# Patient Record
Sex: FEMALE | Race: WHITE | NOT HISPANIC OR LATINO | Employment: UNEMPLOYED | URBAN - METROPOLITAN AREA
[De-identification: names, ages, dates, MRNs, and addresses within clinical notes are randomized per-mention and may not be internally consistent; named-entity substitution may affect disease eponyms.]

---

## 2017-03-20 ENCOUNTER — MYC MEDICAL ADVICE (OUTPATIENT)
Dept: FAMILY MEDICINE | Facility: CLINIC | Age: 52
End: 2017-03-20

## 2017-03-20 DIAGNOSIS — Z72.820 POOR SLEEP: Primary | ICD-10-CM

## 2017-03-20 NOTE — TELEPHONE ENCOUNTER
Please advise regarding MRI and sleep issue-should she have another sleep study?  Lori Heard, RN  Triage Nurse

## 2017-03-23 NOTE — TELEPHONE ENCOUNTER
Recommend starting with sleep evaluation; referral placed.  If that doesn't help identify the cause, then a follow up visit could be considered.

## 2017-05-06 ENCOUNTER — MYC MEDICAL ADVICE (OUTPATIENT)
Dept: FAMILY MEDICINE | Facility: CLINIC | Age: 52
End: 2017-05-06

## 2017-05-08 ENCOUNTER — MYC MEDICAL ADVICE (OUTPATIENT)
Dept: FAMILY MEDICINE | Facility: CLINIC | Age: 52
End: 2017-05-08

## 2017-05-08 DIAGNOSIS — Z12.31 VISIT FOR SCREENING MAMMOGRAM: Primary | ICD-10-CM

## 2017-05-08 DIAGNOSIS — Z12.31 ENCOUNTER FOR SCREENING MAMMOGRAM FOR BREAST CANCER: Primary | ICD-10-CM

## 2017-05-09 ENCOUNTER — OFFICE VISIT (OUTPATIENT)
Dept: SLEEP MEDICINE | Facility: CLINIC | Age: 52
End: 2017-05-09
Payer: COMMERCIAL

## 2017-05-09 VITALS
BODY MASS INDEX: 40.02 KG/M2 | HEART RATE: 86 BPM | RESPIRATION RATE: 15 BRPM | OXYGEN SATURATION: 95 % | WEIGHT: 255 LBS | DIASTOLIC BLOOD PRESSURE: 92 MMHG | HEIGHT: 67 IN | SYSTOLIC BLOOD PRESSURE: 142 MMHG

## 2017-05-09 DIAGNOSIS — F45.8 BRUXISM: ICD-10-CM

## 2017-05-09 DIAGNOSIS — Z72.821 POOR SLEEP HYGIENE: ICD-10-CM

## 2017-05-09 DIAGNOSIS — G47.33 OSA (OBSTRUCTIVE SLEEP APNEA): Primary | ICD-10-CM

## 2017-05-09 DIAGNOSIS — E66.9 OBESITY (BMI 30-39.9): ICD-10-CM

## 2017-05-09 PROCEDURE — 99244 OFF/OP CNSLTJ NEW/EST MOD 40: CPT | Performed by: INTERNAL MEDICINE

## 2017-05-09 RX ORDER — LORATADINE 10 MG/1
10 TABLET ORAL DAILY PRN
COMMUNITY
End: 2021-08-16

## 2017-05-09 NOTE — PROGRESS NOTES
[facility]  Outpatient Sleep Medicine Consultation  May 9, 2017      Name: Ne Chavis MRN# [id number]   Age: 52 year old YOB: 1965     Date of Consultation: May 9, 2017  Consultation is requested by: Dorene Waldron MD  [facility]  [address] [place] [address]  Primary care provider: Dorene Waldron  Home clinic: [facility]         Reason for Sleep Consult:     Ne Chavis is a 52 year old female for re-evaluation of sleep apnea.         Assessment and Plan:     Summary Sleep Diagnoses/Recommendations:    1. Obstructive Sleep Apnea :  Known hx of sleep apnea s/p UPPP. Now she is symptomatic after weight gain 50 lbssince last sleep study. Will schedule Home sleep study.We also discussed the pathophysiology of sleep disordered breathing and the importance of treating it if S/he should have it. Patient is advised not to drive if he/she feels drowsy or sleepy. Follow up after sleep study to discuss the result of sleep study and treatment options. Maintenance insomnia may related to symptomatic sleep disordered breathing.   Patient was intolerant CPAP therapy in the past and would like different treatment option, like oral appliance if therapy is considered.    2. Obesity:  Counseled regarding weight loss through diet modification and increased physical activity. Patient was given instuctions of weight loss and advised to follow up her PCP for further weight loss interventions.    3.  Poor sleep hygiene:   - We instructed patient to develop regular sleep-wake schedule and regular sleep habits including regular bedtime and wake time to strengthen circadian rhythm, to sleep as much as needed to feel refreshed, not to spend more time in bed than needed. Bedroom should be dark, cool and quiet.  - We also asked patient to avoid napping during the day, forcing yourself to sleep, taking problems to bed, strenuous activity just before  bedtime, use of caffeine, alcohol or tobacco just before bedtime, reading, eating or watching TV in bed.  Good sleep hygiene and sleep-wake instructions were given.    4.  Bruxism: continue mouth and follow up dentist for mouth guard.        Orders Placed This Encounter   Procedures     HST-Home Sleep Apnea Test       Summary Counseling:  See instructions    Counseling included a comprehensive review of diagnostic and therapeutic strategies as well as risks of inadequate therapy.  Educational materials provided in instructions.    All questions were answered.  The patient indicates understanding of the above issues and agrees with the plan set forth.           History of Present Illness:     Ne Chavis is a 52 year old female with history of severe obstructive sleep apnea S/p UPPP, hyperlipidemia, history of lung sarcoidosis who presents to the [facility] in [place] for re-evaluation of sleep apnea. I was asked to see Ms. Chavis regarding sleep apnea by Dr. Carlisle. Patient was diagnosed severe sleep apnea by sleep study on 8/15/2003. First she tired CPAP for 4 months and unable to tolerate due mask issue and allergies. She wsas seen by Dr. Isaac (ENT) and underwent tonsillectomy with uvulopalatopharyngoplasty and base of tongue somnoplasty. She had repeat sleep study on 2/2005 which showed no sleep apnea. Patient complains witnessed apnea, morning headache and dry mouth. She has difficulty staying sleep at night and frequent awakening. She also wakes up with headache at night and she takes Excedrin with help. She gained about 50 lbs since 2005. She takes Trazodone at night, sometimes helps.    Please see below for sleep ROS details.    PREVIOUS IN- LAB or HOME SLEEP STUDIES:   Date: 8/15/2003   TYPE: PSG   AHI: 38/hr   Titration: CPAP titrated up to 10 cmH2O   Lowest O2 saturation: 85%    PREVIOUS IN- LAB or HOME SLEEP STUDIES: after UPPP surgery   Date: 2/23/2005   TYPE: PSG   AHI: 3.4/hr   Lowest  O2 saturation: 85%      SLEEP-WAKE SCHEDULE:     Ne Chavis     -Describes themself as a morning person;      -ON WEEKDAYS, goes to sleep at 10:00 PM during the week; awakens  5:30 AM with an alarm; falls asleep in 10-15 minutes; denies difficulty falling asleep.     -ON WEEKENDS, goes to sleep at 10:00 PM and wakes up at 7:30 AM without an alarm; falls asleep in 10 minutes.       -Awakens 2-4 times a night for 15-60 minutes before falling back to sleep; awakens to uncertain reasons and then watch TV and lays bed.      -Total sleep time: 5-7 hours per night.    -Naps 0 times/days per week      BEDTIME ACTIVITIES AND SHIFT WORK:    Ne Chavis    -does watch TV in bed and read in bed and does not use electronics in bed.     -does not do shift work.  He/she works day shifts.       SCALES       SLEEP APNEA: Stopbang score: 5       INSOMNIA:  Insomnia severity score: 10       SLEEPINESS: [place] sleepiness scale (ESS):  6   Drowsy driving/near accidents: No          PHQ9: N/A    SLEEP COMPLAINTS:   Snoring- ?  Witness apnea: Yes  Gasping/Choking: No  Excessive daytime sleep: No  Toss/turn: Yes  Excessive tiredness/fatigue:  No  Morning headaches: Yes  Dry mouth/throat: Yes  Dyspnea: No  Coexisting Lung disease: Yes    Coexisting Heart disease: No    Does patient have a bed partner: No  Has bed partner been sleeping separately because of snoring:  N/A            RLS Screen: When you try to relax in the evening or sleep at  night, do you ever have unpleasant, restless feelings in your  legs that can be relieved by walking or movement? No    Periodic limb movement: No    Narcolepsy:       denies sudden urges of sleep attacks     denies cataplexy     denies sleep paralysis      denies hallucinations     Sleep Behaviors:     denies leg symptoms/movements     denies motor restlessness     denies night terrors     Yes bruxism, uses mouth guard     denies automatic behaviors    Other subjective complaints:     Yes  anxiety or rumination      denies pain and discomfort at  night     denies waking up with heart pounding or racing     denies GERD/heartburn         Parasomnia:   NREM - denies recurrent persistent confusional arousal, night eating, sleep terrors. ? Sleepwalking, wakes up at night during the dream and thinks someone is caming home and she turn lights on unknowingly. The dream is not scary.   REM  - denies dream enactment; injuries     Safety: None             Medications:     Current Outpatient Prescriptions   Medication Sig     VITAMIN D, CHOLECALCIFEROL, PO Take 1,000 Units by mouth daily     traZODone (DESYREL) 50 MG tablet Take 0.5-1 tablets (25-50 mg) by mouth nightly as needed for sleep     Calcium Carbonate-Vitamin D (CALCIUM + D) 600-200 MG-UNIT per tablet Take 2 tablets by mouth 2 times daily.     Glucosamine-Chondroit-Vit C-Mn (GLUCOSAMINE CHONDR 1500 COMPLX) CAPS Take 2 capsules by mouth 2 times daily.     OMEGA 3 1000 MG OR CAPS daily     MULTI-DAY VITAMINS OR None Entered     No current facility-administered medications for this visit.         Medication that can affect sleep: Trazodone    Allergies   Allergen Reactions     No Known Drug Allergies             Past Medical History:     Does not need 02 supplement at night     Past Medical History:   Diagnosis Date     Herpes zoster without mention of complication      Other and unspecified anterior pituitary hyperfunction 1990    saw endo; MRI; Elevated Prolactin level     Other and unspecified hyperlipidemia      Sarcoidosis (H) 2004    BX per Pulmonary; Prednisone for a bit     Unspecified hemorrhoids without mention of complication                Past Surgical History:    Yes previous upper airway surgery     Past Surgical History:   Procedure Laterality Date     ARTHROSCOPY KNEE RT/LT  4/30/2009    Dr. Hodges, Right     C RAD RESEC TONSIL/PILLARS  ~2005     LAPAROSCOPIC CHOLECYSTECTOMY  9/10/2008    Dr. Aguayo     THORACOSCOPIC BIOPSY RIB  2004  "   Dr. Johnson            Social History:     Social History   Substance Use Topics     Smoking status: Never Smoker     Smokeless tobacco: Never Used     Alcohol use Yes      Comment: two per year         Chemical History:     Tobacco: No     Uses no caffeine.    EtOH: Yes, very rare  Recreational Drugs: No    Psych Hx:   None    Current dangers to self or others: None           Family History:     Family History   Problem Relation Age of Onset     Cancer - colorectal Father       at age 64; Dx @61     Hypertension Mother      Arthritis Mother      osteoarthritis     CEREBROVASCULAR DISEASE Mother      DIABETES Maternal Grandmother      Thyroid Disease Sister      Hypertension Sister      Hypertension Sister      Hypertension Sister      Hypertension Sister      Hypertension Sister         Sleep Family Hx:        RLS- No  EDELMIRA - sister  Insomnia - No  Parasomnia - No         Review of Systems:     A complete 10 point review of systems was negative other than HPI or as commented below:   Patient denies chest pain,  wheezing, abdominal pain, n&v, fever, chills, dysuria, leg pain or swelling. Patient is also denies ear pain, sore throat, postnasal drip, running nose.  Patient has dyspnea with activity, dry cough, irregular period, joint pain and swelling, depression and anxiety.    Ne Chavis has gained 50 pounds in 12 years.            Physical Examination:   BP (!) 142/92  Pulse 86  Resp 15  Ht 1.702 m (5' 7\")  Wt 115.7 kg (255 lb)  SpO2 95%  BMI 39.94 kg/m2     Neck Circumference: 45 cm   Constitutional: . Awake, alert, cooperative, in no apparent distress  Mood: euthymic; affect congruent with full range and intensity.  Attention/Concentration:  Normal   Eyes: Pupils round and reactive. No icterus.  ENT: Mallampati Class: IV.   Tonsillar Stage: 0  surgically removed  Clear nasal passages. Enlarged inferior turbinates. No deviated septum.  Oropharynx: No high arched palate. No pharyngeal erythema or " exudates, S/p UPPP   Tongue: No macroglossia   Dentition: Good.  Dentures: None  Neck: Supple, no thyroid enlargement.   Cardiovascular: Regular S1 and S2, no gallops or murmurs.   Pulmonary:  Chest symmetric, lungs clear bilaterally and no crackles, wheezes or rales.  Abdomen: Soft, obese, non tender.  Extremities:  No pedal edema.  Muscle/joint: Strength and tone normal   Skin:  No rash or significant lesions.   Neurologic: Alert, oriented x3, no focal neurological deficit.           Data: All pertinent previous laboratory data reviewed     No results found for: PH, PHARTERIAL, PO2, [id number], SAT, PCO2, HCO3, BASEEXCESS, ANGELLA, BEB  Lab Results   Component Value Date    TSH 1.40 08/31/2016    TSH 1.61 05/26/2015     Lab Results   Component Value Date    GLC 97 08/31/2016    GLC 91 05/26/2015     Lab Results   Component Value Date    HGB 14.2 02/24/2014    HGB 14.6 03/19/2013     Lab Results   Component Value Date    BUN 20 08/31/2016    BUN 16 05/26/2015    CR 0.88 08/31/2016    CR 0.95 05/26/2015     Lab Results   Component Value Date    CO2 25 08/31/2016    CO2 27 05/26/2015     No results found for: SHEEBA      Echocardiography: No    Chest x-ray: Ramon 3, 2012 8:37:00 AM    FINDINGS: Negative.    PFT: No        Copy to: Dorene Waldron MD 5/9/2017   [facility] [address], [place] [address]   [phone] Clinic    Total time spent with patient: 40 minutes with this patient today in which 25 minutes was spent in counseling/coordination of care and going over planned testing and recommendations.

## 2017-05-09 NOTE — NURSING NOTE
"Chief Complaint   Patient presents with     Consult     Can get to sleep but can't stay asleep.  No cpap.  SS 13-14yrs ago, only interp available.       Initial Pulse 86  Resp 15  Ht 1.702 m (5' 7\")  Wt 115.7 kg (255 lb)  SpO2 95%  BMI 39.94 kg/m2 Estimated body mass index is 39.94 kg/(m^2) as calculated from the following:    Height as of this encounter: 1.702 m (5' 7\").    Weight as of this encounter: 115.7 kg (255 lb).  Medication Reconciliation: complete       Neck 45cm  17 1/2in  Ess 6      Mary Alice Hdz LPN/MA  "

## 2017-05-09 NOTE — TELEPHONE ENCOUNTER
Looks like the mammogram ordered yesterday is diagnostic.  Will change to screening.  Lori Heard, RN  Triage Nurse

## 2017-05-09 NOTE — MR AVS SNAPSHOT
"              After Visit Summary   5/9/2017    Ne Chavis    MRN: [id number]           Patient Information     Date Of Birth          1965        Visit Information        Provider Department      5/9/2017 3:00 PM Sandip Rivera MD [facility]        Today's Diagnoses     EDELMIRA (obstructive sleep apnea)    -  1      Care Instructions    MY TREATMENT INFORMATION FOR SLEEP APNEA-  Ne Chavis    DOCTOR : Sandip Rivera  SLEEP CENTER :  [facility]  MY CONTACT NUMBER:[phone]      Sleep apnea: Sleep study ordered.    Follow up in sleep clinic 1-2 weeks after sleep study to discuss results of sleep study and treatment options.    Patient was advised not to drive if drowsy or sleepy.    Frequently asked questions:  1. What is Obstructive Sleep Apnea (EDELMIRA)? EDELMIRA is the most common type of sleep apnea. Apnea literally means, \"without breath.\" It is characterized by repetitive pauses in breathing, despite continued effort to breathe, and is usually associated with a reduction in blood oxygen saturation. Apneas can last 10 to over 60 seconds. It is caused by narrowing or collapse of the upper airway as muscles relax during sleep. Severity of sleep apnea is determined by frequency of breathing events and their effect on your sleep and oxygen levels determined during sleep testing.   2. What are the consequences of EDELMIRA? Symptoms include: daytime sleepiness- possibly increasing the risk of falling asleep while driving, unrefreshing/restless sleep, snoring, insomnia, waking frequently to urinate, waking with heartburn or reflux, reduced concentration and memory, and morning headaches. Other health consequences may include development of high blood pressure and other cardiovascular disease in persons who are susceptible. Untreated EDELMIRA  can contribute to heart disease, stroke and diabetes.   3. What are the treatment options? In most situations, sleep apnea is a lifelong disease that " must be managed with daily therapy. Medications are not effective for sleep apnea and surgery is generally not performed until other therapies have been tried. Therapy is usually tailored to the individual patient based on many factors including your wishes as well as severity of sleep apnea and severity of obesity. Continuous Positive Airway (CPAP) is the most reliable treatment. An oral device to hold your jaw forward is usually the next most reliable option. Other options include postioning devices (to keep you off your back), weight loss, and surgery including a tongue pacing device. There is more detail about some of these options below.            1. CPAP-  WHAT DOES IT DO AND HOW CAN I LEARN TO WEAR IT?                               BEFORE I START, CAN I WATCH A MOVIE TO GET A PLAN ON HOW TO USE CPAP?  https://www.PaletteApp.com/watch?a=b5O35wa160L      Continuous positive airway pressure, or CPAP, is the most effective treatment for obstructive sleep apnea. It works by blowing room air, through a mask, to hold your throat open. A decision to use CPAP is a major step forward in the pursuit of a healthier life. The successful use of CPAP will help you breathe easier, sleep better and live healthier. You can choose CPAP equipment from any durable medical equipment provider that meets your needs.  Using CPAP can be a positive experience if you keep these hobson points in mind:  1. Commitment  CPAP is not a quick fix for your problem. It involves a long-term commitment to improve your sleep and your health.    2. Communication  Stay in close communication with both your sleep doctor and your CPAP supplier. Ask lots of questions and seek help when you need it.    3. Consistency  Use CPAP all night, every night and for every nap. You will receive the maximum health benefits from CPAP when you use it every time that you sleep. This will also make it easier for your body to adjust to the treatment.    4. Correction  The  "first machine and mask that you try may not be the best ones for you. Work with your sleep doctor and your CPAP supplier to make corrections to your equipment selection. Ask about trying a different type of machine or mask if you have ongoing problems. Make sure that your mask is a good fit and learn to use your equipment properly.    5. Challenge  Tell a family member or close friend to ask you each morning if you used your CPAP the previous night. Have someone to challenge you to give it your best effort.    6. Connection   Your adjustment to CPAP will be easier if you are able to connect with others who use the same treatment. Ask your sleep doctor if there is a support group in your area for people who have sleep apnea, or look for one on the Internet.  7. Comfort   Increase your level of comfort by using a saline spray, decongestant or heated humidifier if CPAP irritates your nose, mouth or throat. Use your unit's \"ramp\" setting to slowly get used to the air pressure level. There may be soft pads you can buy that will fit over your mask straps. Look on www.CPAP.com for accessories that can help make CPAP use more comfortable.  8. Cleaning   Clean your mask, tubing and headgear on a regular basis. Put this time in your schedule so that you don't forget to do it. Check and replace the filters for your CPAP unit and humidifier.    9. Completion   Although you are never finished with CPAP therapy, you should reward yourself by celebrating the completion of your first month of treatment. Expect this first month to be your hardest period of adjustment. It will involve some trial and error as you find the machine, mask and pressure settings that are right for you.    10. Continuation  After your first month of treatment, continue to make a daily commitment to use your CPAP all night, every night and for every nap.    CPAP-Tips to starting with success:  Begin using your CPAP for short periods of time during the day " while you watch TV or read.    Use CPAP every night and for every nap. Using it less often reduces the health benefits and makes it harder for your body to get used to it.    Make small adjustments to your mask, tubing, straps and headgear until you get the right fit. Tightening the mask may actually worsen the leak.  If it leaves significant marks on your face or irritates the bridge of your nose, it may not be the best mask for you.  Speak with the person who supplied the mask and consider trying other masks. Insurances will allow you to try different masks during the first month of starting CPAP.  Insurance also covers a new mask, hose and filter about every 6 months.    Use a saline nasal spray to ease mild nasal congestion. Neti-Pot or saline nasal rinses may also help. Nasal gel sprays can help reduce nasal dryness.  Biotene mouthwash can be helpful to protect your teeth if you experience frequent dry mouth.  Dry mouth may be a sign of air escaping out of your mouth or out of the mask in the case of a full face mask.  Speak with your provider if you expect that is the case.     Take a nasal decongestant to relieve more severe nasal or sinus congestion.  Do not use Afrin (oxymetazoline) nasal spray more than 3 days in a row.  Speak with your sleep doctor if your nasal congestion is chronic.    Use a heated humidifier that fits your CPAP model to enhance your breathing comfort. Adjust the heat setting up if you get a dry nose or throat, down if you get condensation in the hose or mask.  Position the CPAP lower than you so that any condensation in the hose drains back into the machine rather than towards the mask.    Try a system that uses nasal pillows if traditional masks give you problems.    Clean your mask, tubing and headgear once a week. Make sure the equipment dries fully.    Regularly check and replace the filters for your CPAP unit and humidifier.    Work closely with your sleep provider and your CPAP  supplier to make sure that you have the machine, mask and air pressure setting that works best for you. It is better to stop using it and call your provider to solve problems than to lay awake all night frustrated with the device.    BESIDES CPAP, WHAT OTHER THERAPIES ARE THERE?      Positioning Device  Positioning devices are generally used when sleep apnea is mild and only occurs on your back.This example shows a pillow that straps around the waist. It may be appropriate for those whose sleep study shows milder sleep apnea that occurs primarily when lying flat on one's back. Preliminary studies have shown benefit but effectiveness at home may need to be verified by a home sleep test. These devices are generally not covered by medical insurance.                      Oral Appliance  What is oral appliance therapy?  An oral appliance is a small acrylic device that fits over the upper and lower teeth or tongue (similar to an orthodontic retainer or a mouth guard). This device slightly advances the lower jaw or tongue, which moves the base of the tongue forward, opens the airway, improves breathing and can effectively treat snoring and obstructive sleep apnea sleep apnea. The appliance is fabricated and customized by a qualified dentist with experience in treating snoring and sleep apnea. Oral appliances are usually well tolerated and have relatively high compliance by patients1, 2, 3.  When is an oral appliance indicated?  Oral appliance therapy is recommended as a first-line treatment for patients with primary snoring, mild sleep apnea, and for patients with moderate sleep apnea who prefer appliance therapy to use of CPAP4, 5. Severity of sleep apnea is determined by sleep testing and is based on the number of respiratory events per hour of sleep.   How successful is oral appliance therapy?  The success rate of oral appliance therapy in patients with mild sleep apnea is 75-80% while in patients with moderate sleep  apnea it is 50-70%. The chance of success in patients with severe sleep apnea is 40-50%. The research also shows that oral appliances have a beneficial effect on the cardiovascular health of EDELMIRA patients at the same magnitude as CPAP therapy7.  Oral appliances should be a second-line treatment in cases of severe sleep apnea, but if not completely successful then a combination therapy utilizing CPAP plus oral appliance therapy may be effective. Oral appliances tend to be effective in a broad range of patients although studies show that the patients who have the highest success are females, younger patients, those with milder disease, and less severe obesity. 3, 6.   The chances of success are lower in patients who have more severe EDELMIRA, are older, and those who are morbidly obese.     Example of an oral appliance   Finding a dentist that practices dental sleep medicine  Specific training is available through the American Academy of Dental Sleep Medicine for dentists interested in working in the field of sleep. To find a dentist who is educated in the field of sleep and the use of oral appliances, near you, visit the Web site of the American Academy of Dental Sleep Medicine; also see   http://www.accpstorage.org/newOrganization/patients/oralAppliances.pdf  To search for a dentist certified in these practices:  Http://aadsm.org/FindADentist.aspx?1  1. Denisa et al. Objectively measured vs self-reported compliance during oral appliance therapy for sleep-disordered breathing. Chest 2013; 144(5): 2689-0950.  2. Caroline, et al. Objective measurement of compliance during oral appliance therapy for sleep-disordered breathing. Thorax 2013; 68(1): 91-96.  3. Ludin et al. Mandibular advancement devices in 620 men and women with EDELMIRA and snoring: tolerability and predictors of treatment success. Chest 2004; 125: 8571-0587.  4. Yoko et al. Oral appliances for snoring and EDELMIRA: a review. Sleep 2006; 29: 244-262.  5.  Meme et al. Oral appliance treatment for EDELMIRA: an update. J Clin Sleep Med 2014; 10(2): 215-227.  6. Dania et al. Predictors of OSAH treatment outcome. J Dent Res 2007; 86: 0042-6063.    Nasal Valves                 Nasal valves may not be effective if you have frequent nasal congestion or have difficulty breathing through your nose. They may be an option for mild apnea if other options are not well tolerated. The efficacy of these devices is generally less than CPAP or oral appliances.      Weight Loss:    Weight management is a personal decision.  If you are interested in exploring weight loss strategies, the following discussion covers the impact on weight loss on sleep apnea and the approaches that may be successful.    Weight loss decreases severity of sleep apnea in most people with obesity. For those with mild obesity who have developed snoring with weight gain, even 15-30 pound weight loss can improve and occasionally eliminate sleep apnea.  Structured and life-long dietary and health habits are necessary to lose weight and keep healthier weight levels.     Though there may be significant health benefits from weight loss, long-term weight loss is very difficult to achieve- studies show success with dietary management in less than 10% of people. In addition, substantial weight loss may require years of dietary control and may be difficult if patients have severe obesity. In these cases, surgical management may be considered.  Finally, older individuals who have tolerated obesity without health complications may be less likely to benefit from weight loss strategies.    Your BMI is Body mass index is 39.94 kg/(m^2).  Body mass index (BMI) is one way to tell whether you are at a healthy weight, overweight, or obese. It measures your weight in relation to your height.  A BMI of 18.5 to 24.9 is in the healthy range. A person with a BMI of 25 to 29.9 is considered overweight, and someone with a BMI of 30  or greater is considered obese. More than two-thirds of American adults are considered overweight or obese.  Being overweight or obese increases the risk for further weight gain. Excess weight may lead to heart disease and diabetes.  Creating and following plans for healthy eating and physical activity may help you improve your health.  Weight control is part of healthy lifestyle and includes exercise, emotional health, and healthy eating habits. Careful eating habits lifelong are the mainstay of weight control. Though there are significant health benefits from weight loss, long-term weight loss with diet alone may be very difficult to achieve- studies show long-term success with dietary management in less than 10% of people. Attaining a healthy weight may be especially difficult to achieve in those with severe obesity. In some cases, medications, devices and surgical management might be considered.  What can you do?  If you are overweight or obese and are interested in methods for weight loss, you should discuss this with your provider.     Consider reducing daily calorie intake by 500 calories.     Keep a food journal.     Avoiding skipping meals, consider cutting portions instead.    Diet combined with exercise helps maintain muscle while optimizing fat loss. Strength training is particularly important for building and maintaining muscle mass. Exercise helps reduce stress, increase energy, and improves fitness. Increasing exercise without diet control, however, may not burn enough calories to loose weight.       Start walking three days a week 10-20 minutes at a time    Work towards walking thirty minutes five days a week     Eventually, increase the speed of your walking for 1-2 minutes at time    In addition, we recommend that you review healthy lifestyles and methods for weight loss available through the National Institutes of Health patient information  sites:  http://win.niddk.nih.gov/publications/index.htm    And look into health and wellness programs that may be available through your health insurance provider, employer, local community center, or karen club.    Weight management plan: Patient was referred to their PCP to discuss a diet and exercise plan.    Surgery:    Upper Airway Surgery for EDELMIRA  Surgery for EDELMIRA is a second-line treatment option in the management of sleep apnea.  Surgery should be considered for patients who are having a difficult time tolerating CPAP.    Surgery for EDELMIRA is directed at areas that are responsible for narrowing or complete obstruction of the airway during sleep.  There are a wide range of procedures available to enlarge and/or stabilize the airway to prevent blockage of breathing in the three major areas where it can occur: the palate, tongue, and nasal regions.  Successful surgical treatment depends on the accurate identification of the factors responsible for obstructive sleep apnea in each person.  A personalized approach is required because there is no single treatment that works well for everyone.  Because of anatomic variation, consultation with an examination by a sleep surgeon is a critical first step in determining what surgical options are best for each patient.  In some cases, examination during sedation may be recommended in order to guide the selection of procedures.  Patients will be counseled about risks and benefits as well as the typical recovery course after surgery. Surgery is typically not a cure for a person s EDELMIRA.  However, surgery will often significantly improve one s EDELMIRA severity (termed  success rate ).  Even in the absence of a cure, surgery will decrease the cardiovascular risk associated with OSA7; improve overall quality of life8 (sleepiness, functionality, sleep quality, etc).          Palate Procedures:  Patients with EDELMIRA often have narrowing of their airway in the region of their tonsils and  uvula.  The goals of palate procedures are to widen the airway in this region as well as to help the tissues resist collapse.  Modern palate procedure techniques focus on tissue conservation and soft tissue rearrangement, rather than tissue removal.  Often the uvula is preserved in this procedure. Residual sleep apnea is common in patient after pharyngoplasty with an average reduction in sleep apnea events of 33%2.      Tongue Procedures:  While patients are awake, the muscles that surround the throat are active and keep this region open for breathing. These muscles relax during sleep, allowing the tongue and other structures to collapse and block breathing.  There are several different tongue procedures available.  Selection of a tongue base procedure depends on characteristics seen on physical exam.  Generally, procedures are aimed at removing bulky tissues in this area or preventing the back of the tongue from falling back during sleep.  Success rates for tongue surgery range from 50-62%3.    Hypoglossal Nerve Stimulation:  Hypoglossal nerve stimulation has recently received approval from the [place] Food and Drug Administration for the treatment of obstructive sleep apnea.  This is based on research showing that the system was safe and effective in treating sleep apnea6.  Results showed that the median AHI score decreased 68%, from 29.3 to 9.0. This therapy uses an implant system that senses breathing patterns and delivers mild stimulation to airway muscles, which keeps the airway open during sleep.  The system consists of three fully implanted components: a small generator (similar in size to a pacemaker), a breathing sensor, and a stimulation lead.  Using a small handheld remote, a patient turns the therapy on before bed and off upon awakening.    Candidates for this device must be greater than 22 years of age, have moderate to severe EDELMIRA (AHI between 20-65), BMI less than 32, have tried CPAP/oral  appliance without success, and have appropriate upper airway anatomy (determined by a sleep endoscopy performed by Dr. Zhagn).    Hypoglossal Nerve Stimulation Pathway:    The sleep surgeon s office will work with the patient through the insurance prior-authorization process (including communications and appeals).    Nasal Procedures:  Nasal obstruction can interfere with nasal breathing during the day and night.  Studies have shown that relief of nasal obstruction can improve the ability of some patients to tolerate positive airway pressure therapy for obstructive sleep apnea1.  Treatment options include medications such as nasal saline, topical corticosteroid and antihistamine sprays, and oral medications such as antihistamines or decongestants. Non-surgical treatments can include external nasal dilators for selected patients. If these are not successful by themselves, surgery can improve the nasal airway either alone or in combination with these other options.      Combination Procedures:  Combination of surgical procedures and other treatments may be recommended, particularly if patients have more than one area of narrowing or persistent positional disease.  The success rate of combination surgery ranges from 66-80%2,3.      1. Mikaela FARMER. The Role of the Nose in Snoring and Obstructive Sleep Apnoea: An Update.  Eur Arch Otorhinolaryngol. 2011; 268: 1365-73.  2.  Capmarlene SM; Alejandra JA; Radha JR; Pallanch JF; Janna MB; Linda SG; Lu FALCON. Surgical modifications of the upper airway for obstructive sleep apnea in adults: a systematic review and meta-analysis. SLEEP 2010;33(10):6800-9487. Greg HEIN. Hypopharyngeal surgery in obstructive sleep apnea: an evidence-based medicine review.  Arch Otolaryngol Head Neck Surg. 2006 Feb;132(2):206-13.  3. Wale BELLH1, You Y, Tylor ROBINS. The efficacy of anatomically based multilevel surgery for obstructive sleep apnea. Otolaryngol Head Neck Surg. 2003  Oct;129(4):327-35.  4. Kezirian E, Goldberg A. Hypopharyngeal Surgery in Obstructive Sleep Apnea: An Evidence-Based Medicine Review. Arch Otolaryngol Head Neck Surg. 2006 Feb;132(2):206-13.  5. Pramod PERALES et al. Upper-Airway Stimulation for Obstructive Sleep Apnea.  N Engl J Med. 2014 Jan 9;370(2):139-49.  6. Jer Y et al. Increased Incidence of Cardiovascular Disease in Middle-aged Men with Obstructive Sleep Apnea. Am J Respir Crit Care Med; 2002 166: 159-165  7. Santizonirmala OLVERA et al. Studying Life Effects and Effectiveness of Palatopharyngoplasty (SLEEP) study: Subjective Outcomes of Isolated Uvulopalatopharyngoplasty. Otolaryngol Head Neck Surg. 2011; 144: 623-631.    Your blood pressure was checked while you were in clinic today.  Please read the guidelines below about what these numbers mean and what you should do about them.  Your systolic blood pressure is the top number.  This is the pressure when the heart is pumping.  Your diastolic blood pressure is the bottom number.  This is the pressure in between beats.  If your systolic blood pressure is less than 120 and your diastolic blood pressure is less than 80, then your blood pressure is normal. There is nothing more that you need to do about it  If your systolic blood pressure is 120-139 or your diastolic blood pressure is 80-89, your blood pressure may be higher than it should be.  You should have your blood pressure re-checked within a year by a primary care provider.  If your systolic blood pressure is 140 or greater or your diastolic blood pressure is 90 or greater, you may have high blood pressure.  High blood pressure is treatable, but if left untreated over time it can put you at risk for heart attack, stroke, or kidney failure.  You should have your blood pressure re-checked by a primary care provider within the next four weeks.    Good Sleep hygiene tips (American Academy of Sleep Medicine):  Maintain a regular sleep-wake routine    Go to bed at the  same time. Wake up at the same time. Ideally, your schedule will remain the same (+/- 20 minutes) every night of the week.  Avoid naps if possible    Naps decrease the  Sleep Debt  that is so necessary for easy sleep onset.    Each of us needs a certain amount of sleep per 24-hour period. We need that amount, and we don t need more than that.    When we take naps, it decreases the amount of sleep that we need the next night - which may cause sleep fragmentation and difficulty initiating sleep, and may lead to insomnia.  Don t stay in bed awake for more than 15-20 minutes. Stimulus control    If you find your mind racing, or worrying about not being able to sleep during the middle of the night, get out of bed, and sit in a chair in the dark. Do your mind racing in the chair until you are sleepy, then return to bed. No TV or internet or eat during these periods! That will just stimulate you more than desired.    If this happens several times during the night, that is OK. Just maintain your regular wake time, and try to avoid naps.  Don t watch TV or read in bed or eat in bed.    When you watch TV or read in bed or eat in bed, you associate the bed with wakefulness.    The bed is reserved for two things - sleep and hanky panky.  Drink caffeinated drinks with caution    The effects of caffeine may last for several hours after ingestion. Caffeine can fragment sleep, and cause difficulty initiating sleep. If you drink caffeine, use it only before noon.    Remember that soda and tea contain caffeine as well.  Avoid inappropriate substances that interfere with sleep    Cigarettes, alcohol, and over-the-counter medications may cause fragmented sleep.  Exercise regularly    Exercise before 2 pm every day. Exercise promotes continuous sleep.    Avoid rigorous exercise before bedtime. Rigorous exercise circulates endorphins into the body which may cause difficulty initiating sleep.   Have a quiet, comfortable bedroom    Set  your bedroom thermostat at a comfortable temperature. Generally, a little cooler is better than a little warmer.    Turn off the TV and other extraneous noise that may disrupt sleep. Background  white noise  like a fan is OK.    If your pets awaken you, keep them outside the bedroom.    Your bedroom should be dark. Turn off bright lights.    Have a comfortable mattress.  If you are a  clock watcher  at night, hide the clock.      Have a comfortable pre-bedtime routine    A warm bath, shower    Meditation, or quiet time    Wind-down 20 minutes prior of bedtime.              Follow-ups after your visit        Future tests that were ordered for you today     Open Future Orders        Priority Expected Expires Ordered    HST-Home Sleep Apnea Test Routine  11/8/2017 5/9/2017    *MA Screening Digital Bilateral Routine  5/9/2018 5/9/2017            Who to contact     If you have questions or need follow up information about today's clinic visit or your schedule please contact [facility] directly at [phone].  Normal or non-critical lab and imaging results will be communicated to you by Trippy Bandzhart, letter or phone within 4 business days after the clinic has received the results. If you do not hear from us within 7 days, please contact the clinic through [employer]att or phone. If you have a critical or abnormal lab result, we will notify you by phone as soon as possible.  Submit refill requests through [employer] or call your pharmacy and they will forward the refill request to us. Please allow 3 business days for your refill to be completed.          Additional Information About Your Visit        [employer] Information     [employer] gives you secure access to your electronic health record. If you see a primary care provider, you can also send messages to your care team and make appointments. If you have questions, please call your primary care clinic.  If you do not have a primary care provider, please call  "[phone] and they will assist you.        Care EveryWhere ID     This is your Care EveryWhere ID. This could be used by other organizations to access your [place] medical records  [id number]        Your Vitals Were     Pulse Respirations Height Pulse Oximetry BMI (Body Mass Index)       86 15 1.702 m (5' 7\") 95% 39.94 kg/m2        Blood Pressure from Last 3 Encounters:   05/09/17 (!) 142/92   08/31/16 124/78   05/26/15 122/76    Weight from Last 3 Encounters:   05/09/17 115.7 kg (255 lb)   08/31/16 119.3 kg (263 lb 1.6 oz)   05/26/15 112 kg (247 lb)               Primary Care Provider Office Phone # Fax #    Dorene Karie Waldron [phone]       [facility] [address] [facility] [address]        Thank you!     Thank you for choosing [facility]  for your care. Our goal is always to provide you with excellent care. Hearing back from our patients is one way we can continue to improve our services. Please take a few minutes to complete the written survey that you may receive in the mail after your visit with us. Thank you!             Your Updated Medication List - Protect others around you: Learn how to safely use, store and throw away your medicines at www.disposemymeds.org.          This list is accurate as of: 5/9/17  3:34 PM.  Always use your most recent med list.                   Brand Name Dispense Instructions for use    calcium + D 600-200 MG-UNIT Tabs   Generic drug:  calcium carbonate-vitamin D      Take 2 tablets by mouth 2 times daily.       glucosamine chondroitin 1500 complex Caps      Take 2 capsules by mouth 2 times daily.       loratadine 10 MG tablet    CLARITIN     Take 10 mg by mouth daily       MULTI-DAY VITAMINS PO      None Entered       omega 3 1000 MG Caps      daily       traZODone 50 MG tablet    DESYREL    90 tablet    Take 0.5-1 tablets (25-50 mg) by mouth nightly as needed for sleep       VITAMIN D (CHOLECALCIFEROL) PO "      Take 1,000 Units by mouth daily

## 2017-05-31 ENCOUNTER — OFFICE VISIT (OUTPATIENT)
Dept: SLEEP MEDICINE | Facility: CLINIC | Age: 52
End: 2017-05-31
Payer: COMMERCIAL

## 2017-05-31 DIAGNOSIS — G47.33 OSA (OBSTRUCTIVE SLEEP APNEA): ICD-10-CM

## 2017-05-31 DIAGNOSIS — G47.33 OBSTRUCTIVE SLEEP APNEA (ADULT) (PEDIATRIC): ICD-10-CM

## 2017-05-31 PROCEDURE — G0399 HOME SLEEP TEST/TYPE 3 PORTA: HCPCS | Performed by: INTERNAL MEDICINE

## 2017-05-31 NOTE — MR AVS SNAPSHOT
After Visit Summary   5/31/2017    Ne Chavis    MRN: [id number]           Patient Information     Date Of Birth          1965        Visit Information        Provider Department      5/31/2017 10:00 AM  SLEEP LAB [facility]        Today's Diagnoses     Obstructive sleep apnea (adult) (pediatric)           Follow-ups after your visit        Your next 10 appointments already scheduled     Jun 01, 2017 10:00 AM CDT   HST Drop Off with  SLEEP DME   [facility]    [address] [address] [id number] [phone]            Jun 07, 2017  7:45 AM CDT   MA SCREENING DIGITAL BILATERAL with [facility]   [facility]    [address] [place] [id number] [phone]           Do not use any powder, lotion or deodorant under your arms or on your breast. If you do, we will ask you to remove it before your exam.  Wear comfortable, two-piece clothing.  If you have any allergies, tell your care team.  Bring any previous mammograms from other facilities or have them mailed to the breast center.            Jun 14, 2017  4:30 PM CDT   Return Sleep Patient with Sandip Rivera MD   [facility]    [address] [address] [id number] [phone]              Who to contact     If you have questions or need follow up information about today's clinic visit or your schedule please contact [facility] directly at [phone].  Normal or non-critical lab and imaging results will be communicated to you by MyChart, letter or phone within 4 business days after the clinic has received the results. If you do not hear from us within 7 days, please contact the clinic through MyChart or phone. If you have a critical or abnormal lab result, we  will notify you by phone as soon as possible.  Submit refill requests through [employer] or call your pharmacy and they will forward the refill request to us. Please allow 3 business days for your refill to be completed.          Additional Information About Your Visit        [employer] Information     [employer] gives you secure access to your electronic health record. If you see a primary care provider, you can also send messages to your care team and make appointments. If you have questions, please call your primary care clinic.  If you do not have a primary care provider, please call [phone] and they will assist you.        Care EveryWhere ID     This is your Care EveryWhere ID. This could be used by other organizations to access your [place] medical records  [id number]         Blood Pressure from Last 3 Encounters:   05/09/17 (!) 142/92   08/31/16 124/78   05/26/15 122/76    Weight from Last 3 Encounters:   05/09/17 115.7 kg (255 lb)   08/31/16 119.3 kg (263 lb 1.6 oz)   05/26/15 112 kg (247 lb)              Today, you had the following     No orders found for display       Primary Care Provider Office Phone # Fax #    Dorene Waldron [phone]       [facility] [address] [facility]  [facility] [address]        Thank you!     Thank you for choosing [facility]  for your care. Our goal is always to provide you with excellent care. Hearing back from our patients is one way we can continue to improve our services. Please take a few minutes to complete the written survey that you may receive in the mail after your visit with us. Thank you!             Your Updated Medication List - Protect others around you: Learn how to safely use, store and throw away your medicines at www.disposemymeds.org.          This list is accurate as of: 5/31/17  1:01 PM.  Always use your most recent med list.                   Brand Name Dispense Instructions for use    calcium + D  600-200 MG-UNIT Tabs   Generic drug:  calcium carbonate-vitamin D      Take 2 tablets by mouth 2 times daily.       glucosamine chondroitin 1500 complex Caps      Take 2 capsules by mouth 2 times daily.       loratadine 10 MG tablet    CLARITIN     Take 10 mg by mouth daily       MULTI-DAY VITAMINS PO      None Entered       omega 3 1000 MG Caps      daily       traZODone 50 MG tablet    DESYREL    90 tablet    Take 0.5-1 tablets (25-50 mg) by mouth nightly as needed for sleep       VITAMIN D (CHOLECALCIFEROL) PO      Take 1,000 Units by mouth daily

## 2017-05-31 NOTE — PROGRESS NOTES
Patient picked up HST and was instructed on use. They showed understanding by demonstrating it back. Ne applied HST to herself to demonstrate her understanding of use.  She will return on 6/1/2017

## 2017-06-01 ENCOUNTER — TELEPHONE (OUTPATIENT)
Dept: SLEEP MEDICINE | Facility: CLINIC | Age: 52
End: 2017-06-01

## 2017-06-01 ENCOUNTER — DOCUMENTATION ONLY (OUTPATIENT)
Dept: SLEEP MEDICINE | Facility: CLINIC | Age: 52
End: 2017-06-01

## 2017-06-01 NOTE — PROCEDURES
"  [facility] Study Report  ===========================    Patient Information:  --------------------  Ne Chavis  Patient ID:  [id number]   :  1965  Recording date:  2017     Indication of the sleep study: Ne Chavis is a 52 year old female with history of severe obstructive sleep apnea S/p UPPP, hyperlipidemia, history of lung sarcoidosis who was seen at the [facility] in [place] for complaints of witnessed apnea, morning headache and dry mouth. She has difficulty staying sleep at night and frequent awakening. She has known obstructive sleep apnea treated with UPPP in the past. she was intolerant CPAP therapy. Ht 1.702 m (5' 7\")  Wt 115.7 kg (255 lb)  SpO2 95%  BMI 39.94 kg/m2.      Recording Information:  ----------------------  This was a Type 3 unattended sleep study (measuring flow, effort, heart rate and pulse oximetry) performed at home. Please refer to EPIC procedure for detailed scoring report.   This study was considered adequate based on > 4 hours of quality oximetry and respiratory recording. As specified by the AASM Manual for the Scoring of Sleep and Associated events, version 2.3, Rule VIII.D 1B, 4% oxygen desaturation scoring for hypopneas is used as a standard of care on all home sleep apnea testing.  Recording date:  2017   Recording duration:  599.9 minutes  Time in bed:  369.1 minutes  Estimated sleep efficiency was 98.1 %.   Time spent in supine position:  4.0 % of total bed time  The test quality was: adequate for interpretation  The test duration was: adequate for interpretation  Respiratory Analysis:  ---------------------  AHI: 8.0 /hour  AHI (supine):  Non applicable.  AHI (non-supine):  8.3 /hour  THANG: 11.5 /hour (Number of oxygen desaturations per hour)  Snore index: 26.9 (percentage of time spent snoring versus the total time spent in bed)  Central apnea index:  0.2 / hour    The sleep study demonstrated mild sleep disordered breathing " which was characterized predominantly by obstructive apneas and hypopneas. The sleep-disordered breathing was predominantly in all body position. There was no supine sleep position which may underestimated the severity of sleep disordered breathing.    Oximetry Analysis:  ------------------  Baseline oxygen saturation during sleep was low at 88.9%.   Lowest oxygen saturation:  76.0 %  Majority of the sleep time spent with oxygen saturation less than than 90%.   55.9% of the total recording time was spent with oxygen saturation less than 90%.   Time Spent oxygen saturation below 88% was 128 minutes.    Cardiac Analysis:  -----------------  Maximum pulse rate was 95.0 /minute and minimal pulse rate was 62.0/minute. Time spent above 100 bpm was 0.0 minutes and time spent below 40 bpm was 0.0 minutes.    Diagnosis:  ==========  Mild obstructive sleep apnea G47.33  Sleep related hypoxemia G47.36  Hypoxemia, may be due to underlying lung disease R09.02      Recommendations:   ================    1. Based on the presence of the obstructive sleep apnea and marked hypoxemia, treatment could be empirically initiated with Auto-titrating PAP therapy with a range of 6 - 16 cmH2O and plan to change CPAP with fixed pressure in next clinic visit. Recommend clinical follow up with sleep management team.    2. Patient may be a candidate for dental appliance through referral to Sleep Dentistry for the treatment of mild obstructive sleep apnea and/or socially disruptive snoring as patient is intolerant with CPAP.  3. Sleep related hypoxemia and marked hypoxemia with low baseline O 2 saturation, in additon to sleep apnea, there may be secondary to underlying lung disease.  If she is interested, would recommend referral to pulmonolgist provider in setting of history of lung disease, and consider chest x-ray, pulmonary function test, ABG's, 6 minute walk and overnight oximetry.  4. Fit nasal mask with chin strap or as per patient s  preference.   5. Recommend clinical follow up with sleep management team after using PAP for 4-6 weeks for coaching and effectiveness and measures.     6. Recommend optimizing wake-sleep schedule and avoiding sleep deprivation.      Other Recommendations:  ======================  1. Start weight loss program if BMI > 25.    2. Avoid sedating medications, including narcotics, and alcohol, as these may exacerbate sleep apnea and/or underlying respiratory disorders.     3. Avoid sleeping supine position.    4. Avoid driving when drowsy    5. If unable to follow in sleep clinic, then patient should follow with referring physician/primary care doctor.        Electronically signed by:        Sandip Rivera MD  Sleep Medicine Physician  [facility].

## 2017-06-01 NOTE — TELEPHONE ENCOUNTER
The writer discussed Ms. Lieberman the result of home sleep study which showed mild EDELMIRA and marked hypoxemia. She is not complaining nay dyspnea or any respiratory issues. Will keep her appointment on 6/14/17

## 2017-06-12 ENCOUNTER — RADIANT APPOINTMENT (OUTPATIENT)
Dept: MAMMOGRAPHY | Facility: CLINIC | Age: 52
End: 2017-06-12
Attending: INTERNAL MEDICINE
Payer: COMMERCIAL

## 2017-06-12 DIAGNOSIS — Z12.31 VISIT FOR SCREENING MAMMOGRAM: ICD-10-CM

## 2017-06-12 PROCEDURE — G0202 SCR MAMMO BI INCL CAD: HCPCS | Mod: TC

## 2017-06-14 ENCOUNTER — OFFICE VISIT (OUTPATIENT)
Dept: SLEEP MEDICINE | Facility: CLINIC | Age: 52
End: 2017-06-14
Payer: COMMERCIAL

## 2017-06-14 VITALS
HEIGHT: 67 IN | HEART RATE: 84 BPM | BODY MASS INDEX: 39.24 KG/M2 | DIASTOLIC BLOOD PRESSURE: 98 MMHG | WEIGHT: 250 LBS | OXYGEN SATURATION: 94 % | SYSTOLIC BLOOD PRESSURE: 159 MMHG

## 2017-06-14 DIAGNOSIS — R09.02 HYPOXEMIA: ICD-10-CM

## 2017-06-14 DIAGNOSIS — G47.34 SLEEP RELATED HYPOXIA: ICD-10-CM

## 2017-06-14 DIAGNOSIS — E66.9 OBESITY (BMI 30-39.9): ICD-10-CM

## 2017-06-14 DIAGNOSIS — G47.33 OSA (OBSTRUCTIVE SLEEP APNEA): Primary | ICD-10-CM

## 2017-06-14 PROCEDURE — 99214 OFFICE O/P EST MOD 30 MIN: CPT | Performed by: INTERNAL MEDICINE

## 2017-06-14 NOTE — PATIENT INSTRUCTIONS
Your BMI is Body mass index is 38.86 kg/(m^2).  Weight management is a personal decision.  If you are interested in exploring weight loss strategies, the following discussion covers the approaches that may be successful. Body mass index (BMI) is one way to tell whether you are at a healthy weight, overweight, or obese. It measures your weight in relation to your height.  A BMI of 18.5 to 24.9 is in the healthy range. A person with a BMI of 25 to 29.9 is considered overweight, and someone with a BMI of 30 or greater is considered obese. More than two-thirds of American adults are considered overweight or obese.  Being overweight or obese increases the risk for further weight gain. Excess weight may lead to heart disease and diabetes.  Creating and following plans for healthy eating and physical activity may help you improve your health.  Weight control is part of healthy lifestyle and includes exercise, emotional health, and healthy eating habits. Careful eating habits lifelong are the mainstay of weight control. Though there are significant health benefits from weight loss, long-term weight loss with diet alone may be very difficult to achieve- studies show long-term success with dietary management in less than 10% of people. Attaining a healthy weight may be especially difficult to achieve in those with severe obesity. In some cases, medications, devices and surgical management might be considered.  What can you do?  If you are overweight or obese and are interested in methods for weight loss, you should discuss this with your provider.     Consider reducing daily calorie intake by 500 calories.     Keep a food journal.     Avoiding skipping meals, consider cutting portions instead.    Diet combined with exercise helps maintain muscle while optimizing fat loss. Strength training is particularly important for building and maintaining muscle mass. Exercise helps reduce stress, increase energy, and improves fitness.  Increasing exercise without diet control, however, may not burn enough calories to loose weight.       Start walking three days a week 10-20 minutes at a time    Work towards walking thirty minutes five days a week     Eventually, increase the speed of your walking for 1-2 minutes at time    In addition, we recommend that you review healthy lifestyles and methods for weight loss available through the National Institutes of Health patient information sites:  http://win.niddk.nih.gov/publications/index.htm    And look into health and wellness programs that may be available through your health insurance provider, employer, local community center, or karen club.    Your blood pressure was checked while you were in clinic today.  Please read the guidelines below about what these numbers mean and what you should do about them.  Your systolic blood pressure is the top number.  This is the pressure when the heart is pumping.  Your diastolic blood pressure is the bottom number.  This is the pressure in between beats.  If your systolic blood pressure is less than 120 and your diastolic blood pressure is less than 80, then your blood pressure is normal. There is nothing more that you need to do about it  If your systolic blood pressure is 120-139 or your diastolic blood pressure is 80-89, your blood pressure may be higher than it should be.  You should have your blood pressure re-checked within a year by a primary care provider.  If your systolic blood pressure is 140 or greater or your diastolic blood pressure is 90 or greater, you may have high blood pressure.  High blood pressure is treatable, but if left untreated over time it can put you at risk for heart attack, stroke, or kidney failure.  You should have your blood pressure re-checked by a primary care provider within the next four weeks.

## 2017-06-14 NOTE — PROGRESS NOTES
Sleep Study Follow-Up Visit:    Date on this visit: 6/14/2017    ASSESSMENT / PLAN:       EDELMIRA (obstructive sleep apnea)  Sleep related hypoxia  Hypoxemia  Obesity (BMI 30-39.9)  Discussed with patient the recent sleep study and treatment options, patient underwent UPPP in the past and intolerant with CPAP. She has low baseline oxygen saturation and sustained hypoxemia, and she is not smoker but hx of lung sarcoidosis and new cough and dyspnea. Patient prefers to wait the decision for treatment of EDELMIRA until she seems lung specialist. Recommend avoiding sleeping supine position and weight loss as below.    For hypoxemia, hx of lung sarcoidosis, will get ABG's and PFT's and PCP and pulmonary specialist to follow up. She followed [facility] in the past. Patient has dry cough and fatigue during the day and night specially at the end of the day.  Counseled regarding weight loss through diet modification and increased physical activity. Patient was given instuctions of weight loss and advised to follow up her PCP for further weight loss interventions.    All questions were answered.  The patient indicates understanding of the above issues and agrees with the plan set forth.    No orders of the defined types were placed in this encounter.      She will follow up with me after lung specialist follow up.      BRIEF SUMMARY:    Ne Chavis is a 52 year old female with history of severe obstructive sleep apnea S/p UPPP and intolerant CPAP in the past, bruxism, hyperlipidemia, history of lung sarcoidosis comes in today for follow-up of her sleep study done on 5/31/17 at the [facility] for result of sleep study.    Home sleep study: 5/31/17   AHI 8/hr   THANG 11.5/hr  Snore index 26.9%  Lowest O 2 saturation is 76%  Time spent O 2 saturation below 88% was 128 minutes    These findings were reviewed with the patient and copy of the sleep study result was given.    Past medical/surgical history, family history, social  "history, medications and allergies were reviewed.      Problem List:  Patient Active Problem List    Diagnosis Date Noted     Sleep concern 2016     Priority: Medium     past trial of Benadryl; discussed Melatonin and Trazodone; willing to try       Family history of malignant neoplasm of thyroid gland 2016     Priority: Medium     3 of 6 sibs;        Morbid obesity, unspecified obesity type (H) 2016     Priority: Medium     past success with Medifast; got to weight of 195 #; slow increase in weight        Benign neoplasm of colon 2015     tubular adenoma 2014 colonsocopy recheck 5 years; also FH- Father at 61       HYPERLIPIDEMIA LDL GOAL <160 02/10/2010     Menorrhagia 2009     irregular; monitor cycles; consider Medroxyprogesterone and if further eval ; may need Pelvic US.       Family history of colon cancer 2009     Father Dx @ 61,   @ age 64.       Other and unspecified anterior pituitary hyperfunction 2003     Sarcoidosis (H) 2003  Pulmonary did Bx; previous Prednisone               Medications:     Current Outpatient Prescriptions   Medication Sig     loratadine (CLARITIN) 10 MG tablet Take 10 mg by mouth daily     VITAMIN D, CHOLECALCIFEROL, PO Take 1,000 Units by mouth daily     traZODone (DESYREL) 50 MG tablet Take 0.5-1 tablets (25-50 mg) by mouth nightly as needed for sleep     Calcium Carbonate-Vitamin D (CALCIUM + D) 600-200 MG-UNIT per tablet Take 2 tablets by mouth 2 times daily.     Glucosamine-Chondroit-Vit C-Mn (GLUCOSAMINE CHONDR 1500 COMPLX) CAPS Take 2 capsules by mouth 2 times daily.     OMEGA 3 1000 MG OR CAPS daily     MULTI-DAY VITAMINS OR None Entered     No current facility-administered medications for this visit.           PHYSICAL EXAMINATION:  Pulse 84  Ht 1.708 m (5' 7.25\")  Wt 113.4 kg (250 lb)  SpO2 94%  BMI 38.86 kg/m2          Data: All pertinent previous laboratory data reviewed     No results found for: PH, " PHARTERIAL, PO2, [id number], SAT, PCO2, HCO3, BASEEXCESS, ANGELLA, BEB  Lab Results   Component Value Date    TSH 1.40 08/31/2016    TSH 1.61 05/26/2015     Lab Results   Component Value Date    GLC 97 08/31/2016    GLC 91 05/26/2015     Lab Results   Component Value Date    HGB 14.2 02/24/2014    HGB 14.6 03/19/2013     Lab Results   Component Value Date    BUN 20 08/31/2016    BUN 16 05/26/2015    CR 0.88 08/31/2016    CR 0.95 05/26/2015     No results found for: SHEEBA     Twenty-five minutes spent with patient, all of which were spent face-to-face counseling, consulting, coordinating plan of care, going over sleep test results and chart review.        Sandip Rivera MD 6/14/2017   [facility]  [address], [place] [address]   [phone] Clinic        Copy to: Dorene Waldron

## 2017-06-14 NOTE — NURSING NOTE
"Chief Complaint   Patient presents with     RECHECK     f/u Hst 5/31       Initial BP (!) 159/98  Pulse 84  Ht 1.708 m (5' 7.25\")  Wt 113.4 kg (250 lb)  SpO2 94%  BMI 38.86 kg/m2 Estimated body mass index is 38.86 kg/(m^2) as calculated from the following:    Height as of this encounter: 1.708 m (5' 7.25\").    Weight as of this encounter: 113.4 kg (250 lb).  Medication Reconciliation: complete         Mary Alice Hdz LPN/MA  "

## 2017-06-14 NOTE — MR AVS SNAPSHOT
After Visit Summary   6/14/2017    Ne Chavis    MRN: [id number]           Patient Information     Date Of Birth          1965        Visit Information        Provider Department      6/14/2017 4:30 PM Sandip Rivera MD [facility]        Today's Diagnoses     EDELMIRA (obstructive sleep apnea)    -  1    Sleep related hypoxia        Hypoxemia        Obesity (BMI 30-39.9)          Care Instructions      Your BMI is Body mass index is 38.86 kg/(m^2).  Weight management is a personal decision.  If you are interested in exploring weight loss strategies, the following discussion covers the approaches that may be successful. Body mass index (BMI) is one way to tell whether you are at a healthy weight, overweight, or obese. It measures your weight in relation to your height.  A BMI of 18.5 to 24.9 is in the healthy range. A person with a BMI of 25 to 29.9 is considered overweight, and someone with a BMI of 30 or greater is considered obese. More than two-thirds of American adults are considered overweight or obese.  Being overweight or obese increases the risk for further weight gain. Excess weight may lead to heart disease and diabetes.  Creating and following plans for healthy eating and physical activity may help you improve your health.  Weight control is part of healthy lifestyle and includes exercise, emotional health, and healthy eating habits. Careful eating habits lifelong are the mainstay of weight control. Though there are significant health benefits from weight loss, long-term weight loss with diet alone may be very difficult to achieve- studies show long-term success with dietary management in less than 10% of people. Attaining a healthy weight may be especially difficult to achieve in those with severe obesity. In some cases, medications, devices and surgical management might be considered.  What can you do?  If you are overweight or obese and are interested in  methods for weight loss, you should discuss this with your provider.     Consider reducing daily calorie intake by 500 calories.     Keep a food journal.     Avoiding skipping meals, consider cutting portions instead.    Diet combined with exercise helps maintain muscle while optimizing fat loss. Strength training is particularly important for building and maintaining muscle mass. Exercise helps reduce stress, increase energy, and improves fitness. Increasing exercise without diet control, however, may not burn enough calories to loose weight.       Start walking three days a week 10-20 minutes at a time    Work towards walking thirty minutes five days a week     Eventually, increase the speed of your walking for 1-2 minutes at time    In addition, we recommend that you review healthy lifestyles and methods for weight loss available through the National Institutes of Health patient information sites:  http://win.niddk.nih.gov/publications/index.htm    And look into health and wellness programs that may be available through your health insurance provider, employer, local community center, or karen club.    Your blood pressure was checked while you were in clinic today.  Please read the guidelines below about what these numbers mean and what you should do about them.  Your systolic blood pressure is the top number.  This is the pressure when the heart is pumping.  Your diastolic blood pressure is the bottom number.  This is the pressure in between beats.  If your systolic blood pressure is less than 120 and your diastolic blood pressure is less than 80, then your blood pressure is normal. There is nothing more that you need to do about it  If your systolic blood pressure is 120-139 or your diastolic blood pressure is 80-89, your blood pressure may be higher than it should be.  You should have your blood pressure re-checked within a year by a primary care provider.  If your systolic blood pressure is 140 or greater or  "your diastolic blood pressure is 90 or greater, you may have high blood pressure.  High blood pressure is treatable, but if left untreated over time it can put you at risk for heart attack, stroke, or kidney failure.  You should have your blood pressure re-checked by a primary care provider within the next four weeks.                    Follow-ups after your visit        Who to contact     If you have questions or need follow up information about today's clinic visit or your schedule please contact [facility] directly at [phone].  Normal or non-critical lab and imaging results will be communicated to you by Soldsiehart, letter or phone within 4 business days after the clinic has received the results. If you do not hear from us within 7 days, please contact the clinic through [facility] or phone. If you have a critical or abnormal lab result, we will notify you by phone as soon as possible.  Submit refill requests through [facility] or call your pharmacy and they will forward the refill request to us. Please allow 3 business days for your refill to be completed.          Additional Information About Your Visit        SoldsieharActSocial Information     [facility] gives you secure access to your electronic health record. If you see a primary care provider, you can also send messages to your care team and make appointments. If you have questions, please call your primary care clinic.  If you do not have a primary care provider, please call [phone] and they will assist you.        Care EveryWhere ID     This is your Care EveryWhere ID. This could be used by other organizations to access your [place] medical records  [id number]        Your Vitals Were     Pulse Height Pulse Oximetry BMI (Body Mass Index)          84 1.708 m (5' 7.25\") 94% 38.86 kg/m2         Blood Pressure from Last 3 Encounters:   06/14/17 (!) 159/98   05/09/17 (!) 142/92   08/31/16 124/78    Weight from Last 3 Encounters:   06/14/17 113.4 " kg (250 lb)   05/09/17 115.7 kg (255 lb)   08/31/16 119.3 kg (263 lb 1.6 oz)              Today, you had the following     No orders found for display       Primary Care Provider Office Phone # Fax #    Dorene Waldron [phone]       [facility] [address]  [facility] [address]        Thank you!     Thank you for choosing [facility]  for your care. Our goal is always to provide you with excellent care. Hearing back from our patients is one way we can continue to improve our services. Please take a few minutes to complete the written survey that you may receive in the mail after your visit with us. Thank you!             Your Updated Medication List - Protect others around you: Learn how to safely use, store and throw away your medicines at www.disposemymeds.org.          This list is accurate as of: 6/14/17  4:52 PM.  Always use your most recent med list.                   Brand Name Dispense Instructions for use    calcium + D 600-200 MG-UNIT Tabs   Generic drug:  calcium carbonate-vitamin D      Take 2 tablets by mouth 2 times daily.       glucosamine chondroitin 1500 complex Caps      Take 2 capsules by mouth 2 times daily.       loratadine 10 MG tablet    CLARITIN     Take 10 mg by mouth daily       MULTI-DAY VITAMINS PO      None Entered       omega 3 1000 MG Caps      daily       traZODone 50 MG tablet    DESYREL    90 tablet    Take 0.5-1 tablets (25-50 mg) by mouth nightly as needed for sleep       VITAMIN D (CHOLECALCIFEROL) PO      Take 1,000 Units by mouth daily

## 2017-07-17 DIAGNOSIS — D86.2 SARCOIDOSIS OF LUNG WITH SARCOIDOSIS OF LYMPH NODES (H): Primary | ICD-10-CM

## 2017-07-18 DIAGNOSIS — D86.2 SARCOIDOSIS OF LUNG WITH SARCOIDOSIS OF LYMPH NODES (H): ICD-10-CM

## 2017-07-18 LAB — CALCIUM SERPL-MCNC: 8.9 MG/DL (ref 8.5–10.1)

## 2017-07-18 PROCEDURE — 82310 ASSAY OF CALCIUM: CPT | Performed by: INTERNAL MEDICINE

## 2017-07-18 PROCEDURE — 36415 COLL VENOUS BLD VENIPUNCTURE: CPT | Performed by: INTERNAL MEDICINE

## 2017-07-18 PROCEDURE — 82164 ANGIOTENSIN I ENZYME TEST: CPT | Mod: 90 | Performed by: INTERNAL MEDICINE

## 2017-07-18 PROCEDURE — 99000 SPECIMEN HANDLING OFFICE-LAB: CPT | Performed by: INTERNAL MEDICINE

## 2017-07-20 LAB — ACE SERPL-CCNC: 20 U/L

## 2017-07-28 ENCOUNTER — TRANSFERRED RECORDS (OUTPATIENT)
Dept: HEALTH INFORMATION MANAGEMENT | Facility: CLINIC | Age: 52
End: 2017-07-28

## 2017-08-09 ENCOUNTER — TRANSFERRED RECORDS (OUTPATIENT)
Dept: HEALTH INFORMATION MANAGEMENT | Facility: CLINIC | Age: 52
End: 2017-08-09

## 2017-09-01 ENCOUNTER — TRANSFERRED RECORDS (OUTPATIENT)
Dept: HEALTH INFORMATION MANAGEMENT | Facility: CLINIC | Age: 52
End: 2017-09-01

## 2017-09-07 ENCOUNTER — TRANSFERRED RECORDS (OUTPATIENT)
Dept: HEALTH INFORMATION MANAGEMENT | Facility: CLINIC | Age: 52
End: 2017-09-07

## 2017-10-30 ENCOUNTER — TRANSFERRED RECORDS (OUTPATIENT)
Dept: HEALTH INFORMATION MANAGEMENT | Facility: CLINIC | Age: 52
End: 2017-10-30

## 2017-11-01 DIAGNOSIS — E78.5 HYPERLIPIDEMIA LDL GOAL <160: ICD-10-CM

## 2017-11-01 DIAGNOSIS — Z80.8: Primary | ICD-10-CM

## 2017-11-20 ENCOUNTER — OFFICE VISIT (OUTPATIENT)
Dept: FAMILY MEDICINE | Facility: CLINIC | Age: 52
End: 2017-11-20
Payer: COMMERCIAL

## 2017-11-20 VITALS
TEMPERATURE: 98 F | DIASTOLIC BLOOD PRESSURE: 80 MMHG | RESPIRATION RATE: 17 BRPM | BODY MASS INDEX: 41.25 KG/M2 | HEART RATE: 75 BPM | HEIGHT: 67 IN | SYSTOLIC BLOOD PRESSURE: 136 MMHG | WEIGHT: 262.8 LBS | OXYGEN SATURATION: 94 %

## 2017-11-20 DIAGNOSIS — Z80.8: ICD-10-CM

## 2017-11-20 DIAGNOSIS — E22.9 ANTERIOR PITUITARY HYPERFUNCTION (H): ICD-10-CM

## 2017-11-20 DIAGNOSIS — Z00.00 ROUTINE GENERAL MEDICAL EXAMINATION AT A HEALTH CARE FACILITY: Primary | ICD-10-CM

## 2017-11-20 DIAGNOSIS — G47.33 OSA (OBSTRUCTIVE SLEEP APNEA): ICD-10-CM

## 2017-11-20 DIAGNOSIS — E55.9 VITAMIN D DEFICIENCY: ICD-10-CM

## 2017-11-20 DIAGNOSIS — E78.5 HYPERLIPIDEMIA LDL GOAL <160: ICD-10-CM

## 2017-11-20 DIAGNOSIS — R51.9 NOCTURNAL HEADACHES: ICD-10-CM

## 2017-11-20 PROCEDURE — 99213 OFFICE O/P EST LOW 20 MIN: CPT | Mod: 25 | Performed by: INTERNAL MEDICINE

## 2017-11-20 PROCEDURE — 82306 VITAMIN D 25 HYDROXY: CPT | Performed by: INTERNAL MEDICINE

## 2017-11-20 PROCEDURE — 84443 ASSAY THYROID STIM HORMONE: CPT | Performed by: INTERNAL MEDICINE

## 2017-11-20 PROCEDURE — 99396 PREV VISIT EST AGE 40-64: CPT | Performed by: INTERNAL MEDICINE

## 2017-11-20 PROCEDURE — 80053 COMPREHEN METABOLIC PANEL: CPT | Performed by: INTERNAL MEDICINE

## 2017-11-20 PROCEDURE — 80061 LIPID PANEL: CPT | Performed by: INTERNAL MEDICINE

## 2017-11-20 PROCEDURE — 36415 COLL VENOUS BLD VENIPUNCTURE: CPT | Performed by: INTERNAL MEDICINE

## 2017-11-20 RX ORDER — BUDESONIDE AND FORMOTEROL FUMARATE DIHYDRATE 160; 4.5 UG/1; UG/1
2 AEROSOL RESPIRATORY (INHALATION) 2 TIMES DAILY
COMMUNITY
End: 2018-05-29

## 2017-11-20 NOTE — MR AVS SNAPSHOT
After Visit Summary   11/20/2017    Ne Chavis    MRN: [id number]           Patient Information     Date Of Birth          1965        Visit Information        Provider Department      11/20/2017 1:30 PM Dorene Waldron MD [facility]        Today's Diagnoses     Routine general medical examination at a health care facility    -  1    EDELMIRA (obstructive sleep apnea)        Anterior pituitary hyperfunction (H)        Nocturnal headaches        Family history of malignant neoplasm of thyroid gland        Hyperlipidemia LDL goal <160        Vitamin D deficiency          Care Instructions      Preventive Health Recommendations  Female Ages 50 - 64    Yearly exam: See your health care provider every year in order to  o Review health changes.   o Discuss preventive care.    o Review your medicines if your doctor has prescribed any.      Get a Pap test every three years (unless you have an abnormal result and your provider advises testing more often).    If you get Pap tests with HPV test, you only need to test every 5 years, unless you have an abnormal result.     You do not need a Pap test if your uterus was removed (hysterectomy) and you have not had cancer.    You should be tested each year for STDs (sexually transmitted diseases) if you're at risk.     Have a mammogram every 1 to 2 years.    Have a colonoscopy at age 50, or have a yearly FIT test (stool test). These exams screen for colon cancer.      Have a cholesterol test every 5 years, or more often if advised.    Have a diabetes test (fasting glucose) every three years. If you are at risk for diabetes, you should have this test more often.     If you are at risk for osteoporosis (brittle bone disease), think about having a bone density scan (DEXA).    Shots: Get a flu shot each year. Get a tetanus shot every 10 years.    Nutrition:     Eat at least 5 servings of fruits and vegetables each day.    Eat whole-grain bread,  whole-wheat pasta and brown rice instead of white grains and rice.    Talk to your provider about Calcium and Vitamin D.     Lifestyle    Exercise at least 150 minutes a week (30 minutes a day, 5 days a week). This will help you control your weight and prevent disease.    Limit alcohol to one drink per day.    No smoking.     Wear sunscreen to prevent skin cancer.     See your dentist every six months for an exam and cleaning.    See your eye doctor every 1 to 2 years.            Follow-ups after your visit        Additional Services     NEUROLOGY ADULT REFERRAL       Your provider has referred you for the following:   Consult at [facility]: [facility] [place] [phone]   [email or web address]    Please be aware that coverage of these services is subject to the terms and limitations of your health insurance plan.  Call member services at your health plan with any benefit or coverage questions.      Please bring the following with you to your appointment:    (1) Any X-Rays, CTs or MRIs which have been performed.  Contact the facility where they were done to arrange for  prior to your scheduled appointment.    (2) List of current medications  (3) This referral request   (4) Any documents/labs given to you for this referral                  Future tests that were ordered for you today     Open Future Orders        Priority Expected Expires Ordered    MR Brain w/o & w Contrast Routine  11/20/2018 11/20/2017            Who to contact     If you have questions or need follow up information about today's clinic visit or your schedule please contact [facility] directly at [phone].  Normal or non-critical lab and imaging results will be communicated to you by MyChart, letter or phone within 4 business days after the clinic has received the results. If you do not hear from us within 7 days, please contact the clinic through MyChart or phone. If you have a critical or  "abnormal lab result, we will notify you by phone as soon as possible.  Submit refill requests through [facility] or call your pharmacy and they will forward the refill request to us. Please allow 3 business days for your refill to be completed.          Additional Information About Your Visit        Warm Healthhart Information     [facility] gives you secure access to your electronic health record. If you see a primary care provider, you can also send messages to your care team and make appointments. If you have questions, please call your primary care clinic.  If you do not have a primary care provider, please call [phone] and they will assist you.        Care EveryWhere ID     This is your Care EveryWhere ID. This could be used by other organizations to access your [place] medical records  [id number]        Your Vitals Were     Pulse Temperature Respirations Height Last Period Pulse Oximetry    75 98  F (36.7  C) (Oral) 17 5' 7\" (1.702 m) 10/21/2017 (Exact Date) 94%    BMI (Body Mass Index)                   41.16 kg/m2            Blood Pressure from Last 3 Encounters:   11/20/17 136/80   06/14/17 (!) 159/98   05/09/17 (!) 142/92    Weight from Last 3 Encounters:   11/20/17 262 lb 12.8 oz (119.2 kg)   06/14/17 250 lb (113.4 kg)   05/09/17 255 lb (115.7 kg)              We Performed the Following     Comprehensive metabolic panel     Lipid panel reflex to direct LDL Fasting     NEUROLOGY ADULT REFERRAL     TSH with free T4 reflex     TSH with free T4 reflex     Vitamin D Deficiency        Primary Care Provider Office Phone # Fax #    Dorene Karie Waldron [phone] [address] [address] [facility] [address]        Equal Access to Services     [facility] Center[facility] : Hadii agustin Braun, waaxda luqadaha, qaybta kaalmada ja delatorre. So [facility] [phone].    ATENCIÓN: Si habla español, tiene a rice disposición servicios gratuitos de asistencia lingüística. Llame " al [phone].    We comply with applicable federal civil rights laws and [place] laws. We do not discriminate on the basis of race, color, national origin, age, disability, sex, sexual orientation, or gender identity.            Thank you!     Thank you for choosing [facility]  for your care. Our goal is always to provide you with excellent care. Hearing back from our patients is one way we can continue to improve our services. Please take a few minutes to complete the written survey that you may receive in the mail after your visit with us. Thank you!             Your Updated Medication List - Protect others around you: Learn how to safely use, store and throw away your medicines at www.disposemymeds.org.          This list is accurate as of: 11/20/17  2:52 PM.  Always use your most recent med list.                   Brand Name Dispense Instructions for use Diagnosis    budesonide-formoterol 160-4.5 MCG/ACT Inhaler    SYMBICORT     Inhale 2 puffs into the lungs 2 times daily        calcium + D 600-200 MG-UNIT Tabs   Generic drug:  calcium carbonate-vitamin D      Take 2 tablets by mouth 2 times daily.        glucosamine chondroitin 1500 complex Caps      Take 2 capsules by mouth 2 times daily.        loratadine 10 MG tablet    CLARITIN     Take 10 mg by mouth daily        MULTI-DAY VITAMINS PO      None Entered        omega 3 1000 MG Caps      daily        traZODone 50 MG tablet    DESYREL    90 tablet    Take 0.5-1 tablets (25-50 mg) by mouth nightly as needed for sleep    Sleep concern       VITAMIN D (CHOLECALCIFEROL) PO      Take 1,000 Units by mouth daily

## 2017-11-20 NOTE — NURSING NOTE
"Chief Complaint   Patient presents with     Physical     pt fasting        Initial /82  Pulse 75  Temp 98  F (36.7  C) (Oral)  Resp 17  Ht 5' 7\" (1.702 m)  Wt 262 lb 12.8 oz (119.2 kg)  LMP 10/21/2017 (Exact Date)  SpO2 94%  BMI 41.16 kg/m2 Estimated body mass index is 41.16 kg/(m^2) as calculated from the following:    Height as of this encounter: 5' 7\" (1.702 m).    Weight as of this encounter: 262 lb 12.8 oz (119.2 kg).  Medication Reconciliation: complete    "

## 2017-11-20 NOTE — PROGRESS NOTES
SUBJECTIVE:   CC: Ne Chavis is an 52 year old woman who presents for preventive health visit.       Physical   Annual:     Getting at least 3 servings of Calcium per day::  Yes    Bi-annual eye exam::  Yes    Dental care twice a year::  Yes    Sleep apnea or symptoms of sleep apnea::  Sleep apnea    Diet::  Regular (no restrictions)    Frequency of exercise::  None    Taking medications regularly::  Yes    Medication side effects::  Not applicable    Additional concerns today::  YES      Today's PHQ-2 Score:   PHQ-2 ( 1999 [employer]) 11/20/2017   Q1: Little interest or pleasure in doing things 1   Q2: Feeling down, depressed or hopeless 1   PHQ-2 Score 2   Q1: Little interest or pleasure in doing things Several days   Q2: Feeling down, depressed or hopeless Several days   PHQ-2 Score 2     Abuse: Current or Past(Physical, Sexual or Emotional)- No  Do you feel safe in your environment - Yes    Social History   Substance Use Topics     Smoking status: Never Smoker     Smokeless tobacco: Never Used     Alcohol use Yes      Comment: two per year     The patient does not drink >3 drinks per day nor >7 drinks per week.    Reviewed orders with patient.  Reviewed health maintenance and updated orders accordingly - Yes  Labs reviewed in [facility]    Patient over age 50, mutual decision to screen reflected in health maintenance.    Pertinent mammograms are reviewed under the imaging tab.  History of abnormal Pap smear: NO - age 30- 65 PAP every 3 years recommended    Reviewed and updated as needed this visit by clinical staffTobacco  Allergies  Meds  Med Hx  Surg Hx  Fam Hx  Soc Hx      Reviewed and updated as needed this visit by Provider        Past Medical History:   Diagnosis Date     Herpes zoster without mention of complication      Other and unspecified anterior pituitary hyperfunction 1990    saw endo; MRI; Elevated Prolactin level     Other and unspecified hyperlipidemia      Sarcoidosis 2004    BX per  Pulmonary; Prednisone for a bit     Unspecified hemorrhoids without mention of complication       Past Surgical History:   Procedure Laterality Date     ARTHROSCOPY KNEE RT/LT  4/30/2009    Dr. Hodges, Right     C RAD RESEC TONSIL/PILLARS  ~2005     LAPAROSCOPIC CHOLECYSTECTOMY  9/10/2008    Dr. Aguayo     THORACOSCOPIC BIOPSY RIB  2004    Dr. Johnson       Review of Systems  CONSTITUTIONAL: NEGATIVE for fever, chills, change in weight  INTEGUMENTARY/SKIN: NEGATIVE for worrisome rashes, moles or lesions  EYES: NEGATIVE for vision changes or irritation  ENT: NEGATIVE for ear, mouth and throat problems  RESP: Obstructive sleep apnea - use of CPAP, began use in 9/2017 - follows with Dr. Ennis; NEGATIVE for significant cough or SOB  BREAST: NEGATIVE for masses, tenderness or discharge  CV: NEGATIVE for chest pain, palpitations or peripheral edema  GI: Morbid obesity, BMI ~41; Hx of benign neoplasm of colon; Family hx of colon cancer; NEGATIVE for nausea, abdominal pain, heartburn, or change in bowel habits   female: Hx of menorrhagia; no unusual urinary symptoms and no unusual vaginal symptoms  MUSCULOSKELETAL:NEGATIVE for significant arthralgias or myalgia  NEURO: POSITIVE for nocturnal headaches; Hx of anterior pituitary hyperfunction, follows with [facility], MRI Q5 years; NEGATIVE for weakness, dizziness or paresthesias  ENDOCRINE: Family hx of malignant neoplasm of thyroid gland; Fasting for labs today; NEGATIVE for temperature intolerance, skin/hair changes  HEME/ALLERGY/IMMUNE: NEGATIVE for bleeding problems  PSYCHIATRIC: Sleep disturbance - use of trazodone; NEGATIVE for changes in mood or affect     This document serves as a record of the services and decisions personally performed and made by Dorene Waldron MD. It was created on her behalf by Nadeen Miller, a trained medical scribe. The creation of this document is based on the provider's statements to the medical scribe.   Nadeen  "Angela, 2:29 PM, November 20, 2017     OBJECTIVE:   /82  Pulse 75  Temp 98  F (36.7  C) (Oral)  Resp 17  Ht 5' 7\" (1.702 m)  Wt 262 lb 12.8 oz (119.2 kg)  LMP 10/21/2017 (Exact Date)  SpO2 94%  BMI 41.16 kg/m2     Physical Exam  GENERAL: healthy, alert and no distress  EYES: Eyes grossly normal to inspection and PERRL  HENT: normal cephalic/atraumatic, ear canals and TM's normal, nose and mouth without ulcers or lesions, oropharynx clear and oral mucous membranes moist  NECK: no adenopathy, no asymmetry, masses, or scars, thyroid normal to palpation and no carotid bruits  RESP: lungs clear to auscultation - no rales, rhonchi or wheezes  BREAST: Clinical breast exam today; normal without masses, tenderness or nipple discharge and no palpable axillary masses or adenopathy  CV: regular rates and rhythm, normal S1 S2, no murmur, peripheral pulses strong and no peripheral edema  ABDOMEN: soft, nontender, without hepatosplenomegaly or masses and bowel sounds normal  MS: extremities normal- no gross deformities noted  SKIN: no suspicious lesions or rashes  NEURO: Normal strength and tone, sensory exam grossly normal and mentation intact  PSYCH: mentation appears normal and affect normal/bright    ASSESSMENT/PLAN:   (Z00.00) Routine general medical examination at a health care facility  (primary encounter diagnosis)  Comment: HEALTH CARE MAINTENANCE and immunizations reviewed and up to date; Clinical breast exam today; Fasting for labs; pap due 5/2018; would not like to have at this time; states she will come in 5/2018; menses continue, lighter flow, shorter duration.  Plan: Annual preventative visit    (G47.33) EDELMIRA (obstructive sleep apnea)  Comment: [facility]- Dr. Ennis; she has had several sleep studies, resolved Sarcoidosis; following up with her 12/2017 to ck on inhaler; pt has some difficulties with getting used to her CPAP but is still trying to use regularly  Plan: continue to follow with Dr." "Loli    (E22.9) Anterior pituitary hyperfunction (H)  Comment: follows with [facility]; MRI Q5 years, due; now with nocturnal headaches, recommend checking MRI prior to neuro evaluation   Plan: MR Brain w/o & w Contrast, NEUROLOGY ADULT         REFERRAL,  TSH with free T4 reflex    (R51) Nocturnal headaches  Comment: follows with [facility]; increased nocturnal headaches  Plan: MR Brain w/o & w Contrast, NEUROLOGY ADULT         REFERRAL    (Z80.8) Family history of malignant neoplasm of thyroid gland  Comment: family hx of thyroid disease, 3 of 6 siblings; labs today  Plan: thyroid labs     (E78.5) Hyperlipidemia LDL goal <160  Comment: LDL at goal in past; fasting for labs today  Plan: will continue to monitor    (E55.9) Vitamin D deficiency  Comment: 2015 low Vit D at 25; will continue to monitor; pt desires labs today; continue vit D supplementation.  Plan: Vitamin D Deficiency      COUNSELING:  Reviewed preventive health counseling, as reflected in patient instructions  Special attention given to:        Regular exercise       Healthy diet/nutrition    BP Screening:   Last 3 BP Readings:    BP Readings from Last 3 Encounters:   11/20/17 136/80   06/14/17 (!) 159/98   05/09/17 (!) 142/92     The following was recommended to the patient:  Re-screen BP within a year and recommended lifestyle modifications     reports that she has never smoked. She has never used smokeless tobacco.    Estimated body mass index is 41.16 kg/(m^2) as calculated from the following:    Height as of this encounter: 5' 7\" (1.702 m).    Weight as of this encounter: 262 lb 12.8 oz (119.2 kg).   Weight management plan: Discussed and encouraged healthy diet and exercise guidelines.    Counseling Resources:  ATP IV Guidelines  Pooled Cohorts Equation Calculator  Breast Cancer Risk Calculator  FRAX Risk Assessment  ICSI Preventive Guidelines  Dietary Guidelines for Americans, 2010  USDA's MyPlate  ASA " Prophylaxis  Lung CA Screening    Dorene Waldron MD  Internal Medicine  [facility] [place]  15 minutes in addition to HEALTH CARE MAINTENANCE are spent with patient, over 50% of that time spent providing counselling, discussing and reviewing medical conditions/concerns, meds and potential side effects.      The information in this document, created by a medical scribe for me, accurately reflects the services I personally performed and the decisions made by me. I have reviewed and approved this document for accuracy.  Dr. Dorene Waldron, 2:56 PM, November 20, 2017    Answers for HPI/ROS submitted by the patient on 11/20/2017   PHQ-2 Score: 2

## 2017-11-21 LAB
ALBUMIN SERPL-MCNC: 4.4 G/DL (ref 3.4–5)
ALP SERPL-CCNC: 89 U/L (ref 40–150)
ALT SERPL W P-5'-P-CCNC: 85 U/L (ref 0–50)
ANION GAP SERPL CALCULATED.3IONS-SCNC: 10 MMOL/L (ref 3–14)
AST SERPL W P-5'-P-CCNC: 45 U/L (ref 0–45)
BILIRUB SERPL-MCNC: 0.5 MG/DL (ref 0.2–1.3)
BUN SERPL-MCNC: 20 MG/DL (ref 7–30)
CALCIUM SERPL-MCNC: 9.4 MG/DL (ref 8.5–10.1)
CHLORIDE SERPL-SCNC: 102 MMOL/L (ref 94–109)
CHOLEST SERPL-MCNC: 222 MG/DL
CO2 SERPL-SCNC: 27 MMOL/L (ref 20–32)
CREAT SERPL-MCNC: 0.94 MG/DL (ref 0.52–1.04)
GFR SERPL CREATININE-BSD FRML MDRD: 62 ML/MIN/1.7M2
GLUCOSE SERPL-MCNC: 83 MG/DL (ref 70–99)
HDLC SERPL-MCNC: 64 MG/DL
LDLC SERPL CALC-MCNC: 129 MG/DL
NONHDLC SERPL-MCNC: 158 MG/DL
POTASSIUM SERPL-SCNC: 4.2 MMOL/L (ref 3.4–5.3)
PROT SERPL-MCNC: 8.2 G/DL (ref 6.8–8.8)
SODIUM SERPL-SCNC: 139 MMOL/L (ref 133–144)
TRIGL SERPL-MCNC: 146 MG/DL
TSH SERPL DL<=0.005 MIU/L-ACNC: 2.1 MU/L (ref 0.4–4)

## 2017-11-22 LAB — DEPRECATED CALCIDIOL+CALCIFEROL SERPL-MC: 30 UG/L (ref 20–75)

## 2017-11-26 ENCOUNTER — HOSPITAL ENCOUNTER (OUTPATIENT)
Dept: MRI IMAGING | Facility: CLINIC | Age: 52
Discharge: HOME | End: 2017-11-26
Attending: INTERNAL MEDICINE | Admitting: INTERNAL MEDICINE
Payer: COMMERCIAL

## 2017-11-26 DIAGNOSIS — R51.9 NOCTURNAL HEADACHES: ICD-10-CM

## 2017-11-26 DIAGNOSIS — E22.9 ANTERIOR PITUITARY HYPERFUNCTION (H): ICD-10-CM

## 2017-11-26 PROCEDURE — 70553 MRI BRAIN STEM W/O & W/DYE: CPT

## 2017-11-26 PROCEDURE — 25000128 H RX IP 250 OP 636: Performed by: INTERNAL MEDICINE

## 2017-11-26 PROCEDURE — A9585 GADOBUTROL INJECTION: HCPCS | Performed by: INTERNAL MEDICINE

## 2017-11-26 RX ORDER — GADOBUTROL 604.72 MG/ML
10 INJECTION INTRAVENOUS ONCE
Status: COMPLETED | OUTPATIENT
Start: 2017-11-26 | End: 2017-11-26

## 2017-11-26 RX ADMIN — GADOBUTROL 10 ML: 604.72 INJECTION INTRAVENOUS at 12:40

## 2017-12-14 ENCOUNTER — MYC MEDICAL ADVICE (OUTPATIENT)
Dept: FAMILY MEDICINE | Facility: CLINIC | Age: 52
End: 2017-12-14

## 2018-01-05 ENCOUNTER — TRANSFERRED RECORDS (OUTPATIENT)
Dept: HEALTH INFORMATION MANAGEMENT | Facility: CLINIC | Age: 53
End: 2018-01-05

## 2018-01-26 ENCOUNTER — MYC MEDICAL ADVICE (OUTPATIENT)
Dept: FAMILY MEDICINE | Facility: CLINIC | Age: 53
End: 2018-01-26

## 2018-01-26 NOTE — TELEPHONE ENCOUNTER
Latha Olivia   Sorry to hear you continue to have many of the same symptoms- nocturnal headaches, concerns about bp and possible with your sleep apnea.      BP Readings from Last 3 Encounters:   11/20/17 136/80   06/14/17 (!) 159/98   05/09/17 (!) 142/92      Labs done in November are OK.    The 10-year ASCVD risk score (Aurorawilly SANTIAGO Jr, et al., 2013) is: 1.8%    Values used to calculate the score:      Age: 52 years      Sex: Female      Is Non- : No      Diabetic: No      Tobacco smoker: No      Systolic Blood Pressure: 136 mmHg      Is BP treated: No      HDL Cholesterol: 64 mg/dL      Total Cholesterol: 222 mg/dL       If risk greater than 7.5%, then statin therapy should be considered.  Your risk assessment is less than 7.5%.     Perhaps it would be wise to consider a low dose bp medication. Please make a follow up appt to reassess bp and discuss treatment options.  Have you discussed with Dr. Ennis to check CPAP efficiency;  To see if pressures need to be adjusted..     Dorene Waldron MD  Internal Medicine  electronically signed

## 2018-01-26 NOTE — TELEPHONE ENCOUNTER
Ne,   Sorry to hear you continue to have many of the same symptoms- nocturnal headaches, concerns about bp and possible with your sleep apnea.  BP Readings from Last 3 Encounters:   11/20/17 136/80   06/14/17 (!) 159/98   05/09/17 (!) 142/92      Labs done in November are OK.    The 10-year ASCVD risk score (Natividadwilly SANTIAGO Jr, et al., 2013) is: 1.8%    Values used to calculate the score:      Age: 52 years      Sex: Female      Is Non- : No      Diabetic: No      Tobacco smoker: No      Systolic Blood Pressure: 136 mmHg      Is BP treated: No      HDL Cholesterol: 64 mg/dL      Total Cholesterol: 222 mg/dL      If risk greater than 7.5%, then statin therapy should be considered.  Your risk assessment is less than 7.5%.    Perhaps it would be wise to consider a low dose bp medication. Please make a follow up appt to reassess bp and discuss treatment options.  Have you discussed with Dr. Ennis to check CPAP efficiency;  To see if pressures need to be adjusted..    Dorene Waldron MD  Internal Medicine  electronically signed

## 2018-02-13 ENCOUNTER — OFFICE VISIT (OUTPATIENT)
Dept: FAMILY MEDICINE | Facility: CLINIC | Age: 53
End: 2018-02-13
Payer: COMMERCIAL

## 2018-02-13 VITALS
RESPIRATION RATE: 16 BRPM | WEIGHT: 264.7 LBS | HEIGHT: 67 IN | BODY MASS INDEX: 41.55 KG/M2 | SYSTOLIC BLOOD PRESSURE: 142 MMHG | DIASTOLIC BLOOD PRESSURE: 82 MMHG | HEART RATE: 78 BPM | OXYGEN SATURATION: 95 % | TEMPERATURE: 98.1 F

## 2018-02-13 DIAGNOSIS — G44.219 EPISODIC TENSION-TYPE HEADACHE, NOT INTRACTABLE: ICD-10-CM

## 2018-02-13 DIAGNOSIS — R03.0 ELEVATED BLOOD PRESSURE READING WITHOUT DIAGNOSIS OF HYPERTENSION: Primary | ICD-10-CM

## 2018-02-13 DIAGNOSIS — S46.819A STRAIN OF TRAPEZIUS MUSCLE, UNSPECIFIED LATERALITY, INITIAL ENCOUNTER: ICD-10-CM

## 2018-02-13 DIAGNOSIS — G47.33 OSA (OBSTRUCTIVE SLEEP APNEA): ICD-10-CM

## 2018-02-13 PROCEDURE — 99214 OFFICE O/P EST MOD 30 MIN: CPT | Performed by: INTERNAL MEDICINE

## 2018-02-13 RX ORDER — LISINOPRIL 10 MG/1
5 TABLET ORAL DAILY
Qty: 30 TABLET | Refills: 1 | Status: SHIPPED | OUTPATIENT
Start: 2018-02-13 | End: 2018-04-08

## 2018-02-13 NOTE — PROGRESS NOTES
SUBJECTIVE:   Ne Chavis is a 52 year old female who presents to clinic today for the following health issues:    Patient presents with:  Headache: has been complaining of headaches at night. She believes that they may be caused by an uncomfortable position of the neck when sleeping.   Recheck Medication: has been recommended that she try a low dose blood pressure medication         Amount of exercise or physical activity: None    Problems taking medications regularly: No    Medication side effects: none    Diet: regular (no restrictions)    Problem list and histories reviewed & adjusted, as indicated.  Additional history: as documented    BP Readings from Last 3 Encounters:   02/13/18 142/82   11/20/17 136/80   06/14/17 (!) 159/98    Wt Readings from Last 3 Encounters:   02/13/18 264 lb 11.2 oz (120.1 kg)   11/20/17 262 lb 12.8 oz (119.2 kg)   06/14/17 250 lb (113.4 kg)        Reviewed and updated as needed this visit by clinical staff  Tobacco  Allergies  Meds  Problems  Med Hx  Surg Hx  Fam Hx  Soc Hx        Reviewed and updated as needed this visit by Provider  Allergies  Meds  Problems       ROS:  CONSTITUTIONAL: NEGATIVE for fever, chills, change in weight  RESP: Sleep apnea - use of CPAP; NEGATIVE for significant cough or SOB  CV: Elevated blood pressure reading - previously recommended to try low dose med; NEGATIVE for chest pain, palpitations or peripheral edema  GI: NEGATIVE for nausea, abdominal pain, heartburn, or change in bowel habits  MUSCULOSKELETAL: NEGATIVE for significant arthralgias or myalgia  NEURO: POSITIVE for headaches at night - thought to be due to position of neck while sleeping; NEGATIVE for weakness, dizziness or paresthesias    This document serves as a record of the services and decisions personally performed and made by Dorene Waldron MD. It was created on her behalf by Lu Davis, a trained medical scribe. The creation of this document is based on the provider's  "statements to the medical scribe.   Lu Davis, 3:45 PM, February 13, 2018    OBJECTIVE:   /82  Pulse 78  Temp 98.1  F (36.7  C) (Oral)  Resp 16  Ht 5' 7\" (1.702 m)  Wt 264 lb 11.2 oz (120.1 kg)  LMP 01/16/2018 (Approximate)  SpO2 95%  BMI 41.46 kg/m2  Body mass index is 41.46 kg/(m^2).  GENERAL: healthy, alert and no distress  RESP: lungs clear to auscultation - no rales, rhonchi or wheezes  CV: regular rates and rhythm, normal S1 S2  MS: Increased tone of upper trapezius; extremities normal- no gross deformities noted  NEURO: Normal strength and mentation intact  PSYCH: mentation appears normal and affect normal/bright    Diagnostic Test Results:  none     ASSESSMENT/PLAN:   (R03.0) Elevated blood pressure reading without diagnosis of hypertension (primary encounter diagnosis)  Comment: BP readings elevated; start low dose ACE; take half tablet lisinopril (5 mg) daily; reviewed use of wrist BP monitor; if systolic BP >130 then increase to full tablet (10 mg); goal -125/70's  Plan: lisinopril (PRINIVIL/ZESTRIL) 10 MG tablet          (G44.219) Episodic tension-type headache, not intractable  Comment: pt reports headaches at night; thought to be caused by position of neck while sleeping; [facility] - was recommended general conditioning and Maxalt; increased tone of upper trapezius noted on exam; recommended PT, referral provided   Plan: [facility], AND CHIROPRACTIC REFERRAL          (G47.33) EDELMIRA (obstructive sleep apnea)  Comment: neuro felt night awakening with headaches is often due to EDELMIRA. She started CPAP 9/2017; [facility]; they have monitored and feel setting are appropriate  Plan: continue good CPAP compliance; continue to follow with [facility]     (S46.496F) Strain of trapezius muscle, unspecified laterality, initial encounter  Comment: tightness noted on exam; increased headaches at night; recommended PT, referral provided   Plan: [facility], AND " CHIROPRACTIC REFERRAL          MEDICATIONS:   Orders Placed This Encounter   Medications     lisinopril (PRINIVIL/ZESTRIL) 10 MG tablet     Sig: Take 0.5 tablets (5 mg) by mouth daily For 3-5 days then increase to 10 mg     Dispense:  30 tablet     Refill:  1     There are no discontinued medications.    FUTURE APPOINTMENTS:       - Follow-up visit in 2-3 months  SELF MONITORING:       - Please check blood pressure readings several times per week    Dorene Waldron MD  Internal Medicine  [facility] [place]    The information in this document, created by a medical scribe for me, accurately reflects the services I personally performed and the decisions made by me. I have reviewed and approved this document for accuracy.  Dr. Dorene Waldron, 4:07 PM, February 13, 2018

## 2018-02-13 NOTE — MR AVS SNAPSHOT
After Visit Summary   2/13/2018    Ne Chavis    MRN: [id number]           Patient Information     Date Of Birth          1965        Visit Information        Provider Department      2/13/2018 3:00 PM Dorene Waldron MD [facility]        Today's Diagnoses     Elevated blood pressure reading without diagnosis of hypertension    -  1    Episodic tension-type headache, not intractable        EDELMIRA (obstructive sleep apnea)        Strain of trapezius muscle, unspecified laterality, initial encounter          Care Instructions    Elevated BLOOD PRESSURE.  Start Lisinopril 5 mg ( 1/2 tablet per day for 3-5 days. If SBP ( top) is >130 consistently, then increase to full 10 mg tablet.     Goal BLOOD PRESSURE: 110-125/ 70's    BP Readings from Last 3 Encounters:   02/13/18 142/82   11/20/17 136/80   06/14/17 (!) 159/98               Follow-ups after your visit        Additional Services     [facility] HAND, AND CHIROPRACTIC REFERRAL       **This order will print in the [facility] Scheduling Office**    Physical Therapy, Hand Therapy and Chiropractic Care are available through:    *[facility]  *[facility]  *[facility]    Call one number to schedule at any of the above locations: [phone].    Your provider has referred you to: Physical Therapy at [facility] or [facility]    Indication/Reason for Referral: Neck Pain  Onset of Illness: months, no trauma  Therapy Orders: Evaluate and Treat  Special Programs: None  Special Request: None    Uche Ivey      Additional Comments for the Therapist or Chiropractor:     Please be aware that coverage of these services is subject to the terms and limitations of your health insurance plan.  Call member services at your health plan with any benefit or coverage questions.      Please bring the following to your appointment:    *Your personal calendar for scheduling future appointments  *Comfortable clothing  "                 Who to contact     If you have questions or need follow up information about today's clinic visit or your schedule please contact [facility] directly at [phone].  Normal or non-critical lab and imaging results will be communicated to you by ikaSystemshart, letter or phone within 4 business days after the clinic has received the results. If you do not hear from us within 7 days, please contact the clinic through ikaSystemshart or phone. If you have a critical or abnormal lab result, we will notify you by phone as soon as possible.  Submit refill requests through [employer] or call your pharmacy and they will forward the refill request to us. Please allow 3 business days for your refill to be completed.          Additional Information About Your Visit        [employer] Information     [employer] gives you secure access to your electronic health record. If you see a primary care provider, you can also send messages to your care team and make appointments. If you have questions, please call your primary care clinic.  If you do not have a primary care provider, please call [phone] and they will assist you.        Care EveryWhere ID     This is your Care EveryWhere ID. This could be used by other organizations to access your [place] medical records  [id number]        Your Vitals Were     Pulse Temperature Respirations Height Last Period Pulse Oximetry    78 98.1  F (36.7  C) (Oral) 16 5' 7\" (1.702 m) 01/16/2018 (Approximate) 95%    BMI (Body Mass Index)                   41.46 kg/m2            Blood Pressure from Last 3 Encounters:   02/13/18 142/82   11/20/17 136/80   06/14/17 (!) 159/98    Weight from Last 3 Encounters:   02/13/18 264 lb 11.2 oz (120.1 kg)   11/20/17 262 lb 12.8 oz (119.2 kg)   06/14/17 250 lb (113.4 kg)              We Performed the Following     [facility] HAND, AND CHIROPRACTIC REFERRAL          Today's Medication Changes          These changes are accurate as of 2/13/18  4:04 PM. "  If you have any questions, ask your nurse or doctor.               Start taking these medicines.        Dose/Directions    lisinopril 10 MG tablet   Commonly known as:  PRINIVIL/ZESTRIL   Used for:  Elevated blood pressure reading without diagnosis of hypertension   Started by:  Dorene Waldron MD        Dose:  5 mg   Take 0.5 tablets (5 mg) by mouth daily For 3-5 days then increase to 10 mg   Quantity:  30 tablet   Refills:  1            Where to get your medicines      These medications were sent to [id number] - [place], [place] - [address]  [address], [facility] [address]     Phone:  [phone]     lisinopril 10 MG tablet                Primary Care Provider Office Phone # Fax #    Dorene Waldron [phone] [address] [facility] [address]        Equal Access to Services     [facility]: Hadii aad ku hadasho Soomaali, waaxda luqadaha, qaybta kaalmada adeegyada, waxay idiin hayaan jacques kharaza adan . So [facility] [phone].    ATENCIÓN: Si habla español, tiene a rice disposición servicios gratuitos de asistencia lingüística. Llame al [phone].    We comply with applicable federal civil rights laws and [place] laws. We do not discriminate on the basis of race, color, national origin, age, disability, sex, sexual orientation, or gender identity.            Thank you!     Thank you for choosing [facility]  for your care. Our goal is always to provide you with excellent care. Hearing back from our patients is one way we can continue to improve our services. Please take a few minutes to complete the written survey that you may receive in the mail after your visit with us. Thank you!             Your Updated Medication List - Protect others around you: Learn how to safely use, store and throw away your medicines at www.disposemymeds.org.          This list is accurate as of 2/13/18  4:04 PM.  Always use your most recent med  list.                   Brand Name Dispense Instructions for use Diagnosis    budesonide-formoterol 160-4.5 MCG/ACT Inhaler    SYMBICORT     Inhale 2 puffs into the lungs 2 times daily        calcium + D 600-200 MG-UNIT Tabs   Generic drug:  calcium carbonate-vitamin D      Take 2 tablets by mouth 2 times daily.        glucosamine chondroitin 1500 complex Caps      Take 2 capsules by mouth 2 times daily.        lisinopril 10 MG tablet    PRINIVIL/ZESTRIL    30 tablet    Take 0.5 tablets (5 mg) by mouth daily For 3-5 days then increase to 10 mg    Elevated blood pressure reading without diagnosis of hypertension       loratadine 10 MG tablet    CLARITIN     Take 10 mg by mouth daily        MULTI-DAY VITAMINS PO      None Entered        omega 3 1000 MG Caps      daily        traZODone 50 MG tablet    DESYREL    90 tablet    Take 0.5-1 tablets (25-50 mg) by mouth nightly as needed for sleep    Sleep concern       VITAMIN D (CHOLECALCIFEROL) PO      Take 1,000 Units by mouth daily

## 2018-02-13 NOTE — PATIENT INSTRUCTIONS
Elevated BLOOD PRESSURE.  Start Lisinopril 5 mg ( 1/2 tablet per day for 3-5 days. If SBP ( top) is >130 consistently, then increase to full 10 mg tablet.     Goal BLOOD PRESSURE: 110-125/ 70's    BP Readings from Last 3 Encounters:   02/13/18 142/82   11/20/17 136/80   06/14/17 (!) 159/98

## 2018-02-13 NOTE — NURSING NOTE
"Chief Complaint   Patient presents with     Headache     has been communicating about headaches at night.           Recheck Medication     has been recommended that she try a low dose blood pressure medication          Initial /80  Pulse 78  Temp 98.1  F (36.7  C) (Oral)  Resp 16  Ht 5' 7\" (1.702 m)  Wt 264 lb 11.2 oz (120.1 kg)  LMP 01/16/2018 (Approximate)  SpO2 95%  BMI 41.46 kg/m2 Estimated body mass index is 41.46 kg/(m^2) as calculated from the following:    Height as of this encounter: 5' 7\" (1.702 m).    Weight as of this encounter: 264 lb 11.2 oz (120.1 kg).  Medication Reconciliation: complete    "

## 2018-02-25 ENCOUNTER — MYC MEDICAL ADVICE (OUTPATIENT)
Dept: FAMILY MEDICINE | Facility: CLINIC | Age: 53
End: 2018-02-25

## 2018-02-26 NOTE — TELEPHONE ENCOUNTER
Please advise-should she start the Rizatriptan? Will it help her sleep?  BP variable. Give it more time?    Lori Heard, KATERIN  Triage Nurse

## 2018-04-08 DIAGNOSIS — R03.0 ELEVATED BLOOD PRESSURE READING WITHOUT DIAGNOSIS OF HYPERTENSION: ICD-10-CM

## 2018-04-09 NOTE — TELEPHONE ENCOUNTER
"Requested Prescriptions   Pending Prescriptions Disp Refills     lisinopril (PRINIVIL/ZESTRIL) 10 MG tablet [Pharmacy Med Name: LISINOPRIL 10 MG TABLET]  Last Written Prescription Date:  2/13/18  Last Fill Quantity: 30,  # refills: 1   Last office visit: 2/13/2018 with prescribing provider:  2/13/2018     Future Office Visit:     30 tablet 1     Sig: TAKE 0.5 TABLETS (5 MG) BY MOUTH DAILY FOR 3-5 DAYS THEN INCREASE TO 10 MG    ACE Inhibitors (Including Combos) Protocol Failed    4/8/2018 11:47 AM       Failed - Blood pressure under 140/90 in past 12 months    BP Readings from Last 3 Encounters:   02/13/18 142/82   11/20/17 136/80   06/14/17 (!) 159/98                Passed - Recent (12 mo) or future (30 days) visit within the authorizing provider's specialty    Patient had office visit in the last 12 months or has a visit in the next 30 days with authorizing provider or within the authorizing provider's specialty.  See \"Patient Info\" tab in inbasket, or \"Choose Columns\" in Meds & Orders section of the refill encounter.           Passed - Patient is age 18 or older       Passed - No active pregnancy on record       Passed - Normal serum creatinine on file in past 12 months    Recent Labs   Lab Test  11/20/17   1456   CR  0.94            Passed - Normal serum potassium on file in past 12 months    Recent Labs   Lab Test  11/20/17   1456   POTASSIUM  4.2            Passed - No positive pregnancy test in past 12 months          "

## 2018-04-11 RX ORDER — LISINOPRIL 10 MG/1
TABLET ORAL
Qty: 30 TABLET | Refills: 0 | Status: SHIPPED | OUTPATIENT
Start: 2018-04-11 | End: 2018-05-08

## 2018-04-11 NOTE — TELEPHONE ENCOUNTER
Medication is being filled for 1 time refill only due to:  Patient needs to be seen because need HTN follow up.   Fabio Mendoza RN, BSN

## 2018-04-16 NOTE — TELEPHONE ENCOUNTER
recommend follow upon bp readings.  Elevated bp can certainly present as headache.  Several contributing factors with headaches; the Rizatriptan is fine to use. If headaches are more related to bp, or upper neck strain, the physical therapy could results in significantly lower incidence of headaches and with less pain, bp improved.

## 2018-05-08 DIAGNOSIS — R03.0 ELEVATED BLOOD PRESSURE READING WITHOUT DIAGNOSIS OF HYPERTENSION: ICD-10-CM

## 2018-05-08 NOTE — TELEPHONE ENCOUNTER
"Requested Prescriptions   Pending Prescriptions Disp Refills     lisinopril (PRINIVIL/ZESTRIL) 10 MG tablet [Pharmacy Med Name: LISINOPRIL 10 MG TABLET]  Last Written Prescription Date:  4/11/18  Last Fill Quantity: 30,  # refills: 0   Last office visit: 2/13/2018 with prescribing provider:  2/13/2018     Future Office Visit:     30 tablet 0     Sig: TAKE 0.5 TABLETS (5 MG) BY MOUTH DAILY FOR 3-5 DAYS THEN INCREASE TO 10 MG    ACE Inhibitors (Including Combos) Protocol Failed    5/8/2018 11:13 AM       Failed - Blood pressure under 140/90 in past 12 months    BP Readings from Last 3 Encounters:   02/13/18 142/82   11/20/17 136/80   06/14/17 (!) 159/98                Passed - Recent (12 mo) or future (30 days) visit within the authorizing provider's specialty    Patient had office visit in the last 12 months or has a visit in the next 30 days with authorizing provider or within the authorizing provider's specialty.  See \"Patient Info\" tab in inbasket, or \"Choose Columns\" in Meds & Orders section of the refill encounter.           Passed - Patient is age 18 or older       Passed - No active pregnancy on record       Passed - Normal serum creatinine on file in past 12 months    Recent Labs   Lab Test  11/20/17   1456   CR  0.94            Passed - Normal serum potassium on file in past 12 months    Recent Labs   Lab Test  11/20/17   1456   POTASSIUM  4.2            Passed - No positive pregnancy test in past 12 months          "

## 2018-05-08 NOTE — TELEPHONE ENCOUNTER
"Patient states that she plans on coming in at the end of the month.  Did not want to schedule appointment with me at this time for follow up.       Requested Prescriptions   Pending Prescriptions Disp Refills     lisinopril (PRINIVIL/ZESTRIL) 10 MG tablet  Last Written Prescription Date:  4/11/18  Last Fill Quantity: 30,  # refills: 0   Last office visit: 2/13/2018 with prescribing provider:  2/13/2018     Future Office Visit:     30 tablet 0    ACE Inhibitors (Including Combos) Protocol Failed    5/8/2018 10:44 AM       Failed - Blood pressure under 140/90 in past 12 months    BP Readings from Last 3 Encounters:   02/13/18 142/82   11/20/17 136/80   06/14/17 (!) 159/98                Passed - Recent (12 mo) or future (30 days) visit within the authorizing provider's specialty    Patient had office visit in the last 12 months or has a visit in the next 30 days with authorizing provider or within the authorizing provider's specialty.  See \"Patient Info\" tab in inbasket, or \"Choose Columns\" in Meds & Orders section of the refill encounter.           Passed - Patient is age 18 or older       Passed - No active pregnancy on record       Passed - Normal serum creatinine on file in past 12 months    Recent Labs   Lab Test  11/20/17   1456   CR  0.94            Passed - Normal serum potassium on file in past 12 months    Recent Labs   Lab Test  11/20/17   1456   POTASSIUM  4.2            Passed - No positive pregnancy test in past 12 months          "

## 2018-05-10 RX ORDER — LISINOPRIL 10 MG/1
10 TABLET ORAL DAILY
Qty: 30 TABLET | Refills: 0 | Status: SHIPPED | OUTPATIENT
Start: 2018-05-10 | End: 2018-05-29 | Stop reason: SINTOL

## 2018-05-10 RX ORDER — LISINOPRIL 10 MG/1
TABLET ORAL
Qty: 30 TABLET | Refills: 0 | OUTPATIENT
Start: 2018-05-10

## 2018-05-10 NOTE — TELEPHONE ENCOUNTER
Patient out of med in 2 days. Made appt to see provider 5/29/2018. Refill to cover sent to pharmacy per verbal ok DN.    Nika Weaver RN

## 2018-05-10 NOTE — TELEPHONE ENCOUNTER
Routing refill request to provider for review/approval because:  Ban given x1 and patient did not follow up, please advise    Fidelina TAVERAS RN, BSN, PHN  Gilbert Flex RN

## 2018-05-29 ENCOUNTER — TELEPHONE (OUTPATIENT)
Dept: FAMILY MEDICINE | Facility: CLINIC | Age: 53
End: 2018-05-29

## 2018-05-29 ENCOUNTER — OFFICE VISIT (OUTPATIENT)
Dept: FAMILY MEDICINE | Facility: CLINIC | Age: 53
End: 2018-05-29
Payer: COMMERCIAL

## 2018-05-29 VITALS
RESPIRATION RATE: 17 BRPM | TEMPERATURE: 98.5 F | DIASTOLIC BLOOD PRESSURE: 80 MMHG | OXYGEN SATURATION: 94 % | HEART RATE: 72 BPM | HEIGHT: 67 IN | BODY MASS INDEX: 41.21 KG/M2 | WEIGHT: 262.6 LBS | SYSTOLIC BLOOD PRESSURE: 136 MMHG

## 2018-05-29 DIAGNOSIS — I10 BENIGN ESSENTIAL HYPERTENSION: Primary | ICD-10-CM

## 2018-05-29 DIAGNOSIS — Z12.31 VISIT FOR SCREENING MAMMOGRAM: Primary | ICD-10-CM

## 2018-05-29 DIAGNOSIS — E78.5 HYPERLIPIDEMIA LDL GOAL <160: ICD-10-CM

## 2018-05-29 DIAGNOSIS — E66.01 MORBID OBESITY, UNSPECIFIED OBESITY TYPE (H): ICD-10-CM

## 2018-05-29 PROCEDURE — 99214 OFFICE O/P EST MOD 30 MIN: CPT | Performed by: INTERNAL MEDICINE

## 2018-05-29 RX ORDER — LOSARTAN POTASSIUM 25 MG/1
25 TABLET ORAL DAILY
Qty: 30 TABLET | Refills: 1 | Status: SHIPPED | OUTPATIENT
Start: 2018-05-29 | End: 2018-06-27

## 2018-05-29 NOTE — MR AVS SNAPSHOT
After Visit Summary   5/29/2018    Ne Chavis    MRN: [id number]           Patient Information     Date Of Birth          1965        Visit Information        Provider Department      5/29/2018 7:30 AM Dorene Waldron MD [facility]        Today's Diagnoses     Benign essential hypertension    -  1      Care Instructions    - Bring in BP cuff next visit to make sure it is reading accurately.           Follow-ups after your visit        Follow-up notes from your care team     Return in about 4 weeks (around 6/26/2018) for Routine Visit- due for pap.      Future tests that were ordered for you today     Open Future Orders        Priority Expected Expires Ordered    MA Screening Digital Bilateral Routine  5/29/2019 5/29/2018            Who to contact     If you have questions or need follow up information about today's clinic visit or your schedule please contact [facility] directly at [phone].  Normal or non-critical lab and imaging results will be communicated to you by MyChart, letter or phone within 4 business days after the clinic has received the results. If you do not hear from us within 7 days, please contact the clinic through [employer] or phone. If you have a critical or abnormal lab result, we will notify you by phone as soon as possible.  Submit refill requests through [employer] or call your pharmacy and they will forward the refill request to us. Please allow 3 business days for your refill to be completed.          Additional Information About Your Visit        MyChart Information     [employer] gives you secure access to your electronic health record. If you see a primary care provider, you can also send messages to your care team and make appointments. If you have questions, please call your primary care clinic.  If you do not have a primary care provider, please call [phone] and they will assist you.        Care EveryWhere ID     This is  "your Care EveryWhere ID. This could be used by other organizations to access your [facility] medical records  [id number]        Your Vitals Were     Pulse Temperature Respirations Height Last Period Pulse Oximetry    72 98.5  F (36.9  C) (Oral) 17 5' 7\" (1.702 m) 05/29/2018 (Exact Date) 94%    BMI (Body Mass Index)                   41.13 kg/m2            Blood Pressure from Last 3 Encounters:   05/29/18 136/80   02/13/18 142/82   11/20/17 136/80    Weight from Last 3 Encounters:   05/29/18 262 lb 9.6 oz (119.1 kg)   02/13/18 264 lb 11.2 oz (120.1 kg)   11/20/17 262 lb 12.8 oz (119.2 kg)              Today, you had the following     No orders found for display         Today's Medication Changes          These changes are accurate as of 5/29/18  8:10 AM.  If you have any questions, ask your nurse or doctor.               Start taking these medicines.        Dose/Directions    losartan 25 MG tablet   Commonly known as:  COZAAR   Used for:  Benign essential hypertension   Started by:  Dorene Waldron MD        Dose:  25 mg   Take 1 tablet (25 mg) by mouth daily   Quantity:  30 tablet   Refills:  1         Stop taking these medicines if you haven't already. Please contact your care team if you have questions.     lisinopril 10 MG tablet   Commonly known as:  PRINIVIL/ZESTRIL   Stopped by:  Dorene Waldron MD                Where to get your medicines      These medications were sent to [facility] [address] [facility] [place], [place] - [address]  [address], [facility] [address]     Phone:  [phone]     losartan 25 MG tablet                Primary Care Provider Office Phone # Fax #    Dorene Waldron [phone] [address]  [facility] [address]        Equal Access to Services     [facility] RAKESH : Crow drew Soscott, waaxda luqadaha, qaybta kaalmada adeegyatara, ja adan . [facility] [phone].    ATENCIÓN: Si josé miguel whiting, " tiene a rice disposición servicios gratuitos de asistencia lingüística. Martha puentes [phone].    We comply with applicable federal civil rights laws and [place] laws. We do not discriminate on the basis of race, color, national origin, age, disability, sex, sexual orientation, or gender identity.            Thank you!     Thank you for choosing [facility]  for your care. Our goal is always to provide you with excellent care. Hearing back from our patients is one way we can continue to improve our services. Please take a few minutes to complete the written survey that you may receive in the mail after your visit with us. Thank you!             Your Updated Medication List - Protect others around you: Learn how to safely use, store and throw away your medicines at www.disposemymeds.org.          This list is accurate as of 5/29/18  8:10 AM.  Always use your most recent med list.                   Brand Name Dispense Instructions for use Diagnosis    calcium + D 600-200 MG-UNIT Tabs   Generic drug:  calcium carbonate-vitamin D      Take 2 tablets by mouth 2 times daily.        glucosamine chondroitin 1500 complex Caps      Take 2 capsules by mouth 2 times daily.        loratadine 10 MG tablet    CLARITIN     Take 10 mg by mouth daily        losartan 25 MG tablet    COZAAR    30 tablet    Take 1 tablet (25 mg) by mouth daily    Benign essential hypertension       MULTI-DAY VITAMINS PO      None Entered        omega 3 1000 MG Caps      daily

## 2018-05-29 NOTE — PROGRESS NOTES
SUBJECTIVE:   Ne Chavis is a 53 year old female who presents to clinic today for the following health issues:    Hypertension Follow-up  Patient recently developed a slight cough. Originally thought that it was allergy related until her sister told her that she developed the same cough while on lisinopril. Has a home BP cuff but doesn't think that it is very accurate because of how much it fluctuates, 112, 120's,130's. Every times she performs a BP measurement she makes sure to take it in the evening after relaxing and places her wrist over heart.         Outpatient blood pressures are being checked at home.  Results are 120-130/78-90.    Low Salt Diet: no added salt    Patient is currently taking, lisinopril 10 mg   BP Readings from Last 3 Encounters:   05/29/18 136/80   02/13/18 142/82   11/20/17 136/80         Amount of exercise or physical activity: 2-3 days/week for an average of 15-30 minutes    Problems taking medications regularly: No    Medication side effects: none    Diet: regular (no restrictions)      Problem list and histories reviewed & adjusted, as indicated.  Additional history: as documented    Labs reviewed in EPIC    Reviewed and updated as needed this visit by clinical staff  Tobacco  Allergies  Meds  Problems  Med Hx  Surg Hx  Fam Hx  Soc Hx        Reviewed and updated as needed this visit by Provider  Allergies  Meds  Problems         ROS:  CONSTITUTIONAL:NEGATIVE for fever, chills, change in weight  RESP: POSITIVE for  cough, same one her sister had while on lisinopril; NEGATIVE for significant SOB  CV: HTN - use of lisinopril- cough; NEGATIVE for chest pain, palpitations or peripheral edema  : POSITIVE for starting period this morning, Negative for  abnormal periods.   NEURO: POSITIVE for 2 minor headaches in the last month, they have reduced in frequency and severity compared with previous headaches.   ENDOCRINE: HLD - use of healthy diet and exercise  PSYCHIATRIC:  "NEGATIVE for changes in mood or affect    This document serves as a record of the services and decisions personally performed and made by Dorene Waldron MD. It was created on her behalf by Uriel Petersen, a trained medical scribe. The creation of this document is based on the provider's statements to the medical scribe.   Uriel Petersen, 7:56 AM, May 29, 2018      OBJECTIVE:     /80  Pulse 72  Temp 98.5  F (36.9  C) (Oral)  Resp 17  Ht 5' 7\" (1.702 m)  Wt 262 lb 9.6 oz (119.1 kg)  LMP 05/29/2018 (Exact Date)  SpO2 94%  BMI 41.13 kg/m2  Body mass index is 41.13 kg/(m^2).  GENERAL: healthy, alert and no distress  NECK: no adenopathy, no asymmetry, masses, or scars and thyroid normal to palpation, no carotid bruits  RESP: lungs clear to auscultation - no rales, rhonchi or wheezes  CV: regular rate and rhythm, normal S1 S2, no murmur, no peripheral edema and peripheral pulses strong  ABDOMEN: soft, nontender, no hepatosplenomegaly, no masses and bowel sounds normal  PSYCH: mentation appears normal, affect normal/bright    Diagnostic Test Results:  none     ASSESSMENT/PLAN:   (I10) Benign essential hypertension  (primary encounter diagnosis)  Comment: Controlled on ACE. Patient is concerned about cough that she recently developed, change to ARB and reassess. Will bring her BLOOD PRESSURE cuff to physical in order to assess home vs clinic bp readings. Stop Lisinopril and start Losartan; reassess BLOOD PRESSURE in the next months.  Lab Results   Component Value Date    CR 0.94 11/20/2017    CR 0.88 08/31/2016     Due for labs 2018  Plan: losartan (COZAAR) 25 MG tablet    Weight- encourage healthy diet, regular exercise.     Interested in scheduling annual mammograms at [facility]. Last was June 12th 2017.    Will schedule PAP/ physical        MEDICATIONS:   Orders Placed This Encounter   Medications     losartan (COZAAR) 25 MG tablet     Sig: Take 1 tablet (25 mg) by mouth daily     Dispense:  30 tablet "     Refill:  1     Medications Discontinued During This Encounter   Medication Reason     VITAMIN D, CHOLECALCIFEROL, PO Stopped by Patient     traZODone (DESYREL) 50 MG tablet Stopped by Patient     budesonide-formoterol (SYMBICORT) 160-4.5 MCG/ACT Inhaler Stopped by Patient     lisinopril (PRINIVIL/ZESTRIL) 10 MG tablet Side effects     FUTURE APPOINTMENTS:       - Follow-up visit in 4 weeks   Dorene Waldron MD  Internal Medicine  [facility]    The information in this document, created by a medical scribe for me, accurately reflects the services I personally performed and the decisions made by me. I have reviewed and approved this document for accuracy.  Dr. Dorene Waldron, 8:13 AM, May 29, 2018

## 2018-05-29 NOTE — TELEPHONE ENCOUNTER
Panel Management Review      Patient has the following on her problem list:     Hypertension   Last three blood pressure readings:  BP Readings from Last 3 Encounters:   05/29/18 136/80   02/13/18 142/82   11/20/17 136/80     Blood pressure: Passed    HTN Guidelines:  Age 18-59 BP range:  Less than 140/90  Age 60-85 with Diabetes:  Less than 140/90  Age 60-85 without Diabetes:  less than 150/90      Composite cancer screening  Chart review shows that this patient is due/due soon for the following Pap Smear and Mammogram  Summary:    Patient is due/failing the following:   MAMMOGRAM and PAP    Action needed:   Patient needs referral/order: for mammogram    Type of outreach:    talked with pt    Questions for provider review:    None                                                                                                                                    RIA Dumont LPN       Chart routed to not routed .

## 2018-06-15 ENCOUNTER — RADIANT APPOINTMENT (OUTPATIENT)
Dept: MAMMOGRAPHY | Facility: CLINIC | Age: 53
End: 2018-06-15
Attending: INTERNAL MEDICINE
Payer: COMMERCIAL

## 2018-06-15 DIAGNOSIS — Z12.31 VISIT FOR SCREENING MAMMOGRAM: ICD-10-CM

## 2018-06-15 PROCEDURE — 77067 SCR MAMMO BI INCL CAD: CPT | Mod: TC

## 2018-06-27 ENCOUNTER — OFFICE VISIT (OUTPATIENT)
Dept: FAMILY MEDICINE | Facility: CLINIC | Age: 53
End: 2018-06-27
Payer: COMMERCIAL

## 2018-06-27 VITALS
SYSTOLIC BLOOD PRESSURE: 122 MMHG | HEART RATE: 77 BPM | WEIGHT: 264 LBS | RESPIRATION RATE: 16 BRPM | DIASTOLIC BLOOD PRESSURE: 80 MMHG | TEMPERATURE: 99 F | BODY MASS INDEX: 41.35 KG/M2

## 2018-06-27 DIAGNOSIS — Z01.419 ENCOUNTER FOR GYNECOLOGICAL EXAMINATION WITHOUT ABNORMAL FINDING: ICD-10-CM

## 2018-06-27 DIAGNOSIS — Z12.4 SCREENING FOR MALIGNANT NEOPLASM OF CERVIX: Primary | ICD-10-CM

## 2018-06-27 DIAGNOSIS — R79.89 ELEVATED LFTS: ICD-10-CM

## 2018-06-27 DIAGNOSIS — I10 BENIGN ESSENTIAL HYPERTENSION: ICD-10-CM

## 2018-06-27 DIAGNOSIS — R32 URINARY INCONTINENCE, UNSPECIFIED TYPE: ICD-10-CM

## 2018-06-27 DIAGNOSIS — E66.01 MORBID OBESITY, UNSPECIFIED OBESITY TYPE (H): ICD-10-CM

## 2018-06-27 PROCEDURE — 87624 HPV HI-RISK TYP POOLED RSLT: CPT | Performed by: INTERNAL MEDICINE

## 2018-06-27 PROCEDURE — 99214 OFFICE O/P EST MOD 30 MIN: CPT | Performed by: INTERNAL MEDICINE

## 2018-06-27 PROCEDURE — G0145 SCR C/V CYTO,THINLAYER,RESCR: HCPCS | Performed by: INTERNAL MEDICINE

## 2018-06-27 PROCEDURE — 36415 COLL VENOUS BLD VENIPUNCTURE: CPT | Performed by: INTERNAL MEDICINE

## 2018-06-27 PROCEDURE — 80076 HEPATIC FUNCTION PANEL: CPT | Performed by: INTERNAL MEDICINE

## 2018-06-27 RX ORDER — LOSARTAN POTASSIUM 25 MG/1
25 TABLET ORAL DAILY
Qty: 90 TABLET | Refills: 2 | Status: SHIPPED | OUTPATIENT
Start: 2018-06-27 | End: 2019-01-08

## 2018-06-27 NOTE — PROGRESS NOTES
SUBJECTIVE:   Ne Chavis is a 53 year old female who presents to clinic today for the following health issues:      Hypertension Follow-up      Outpatient blood pressures are being checked at home.  Results are varies,concerned about cuff being accurate.    Low Salt Diet: no added salt      Amount of exercise or physical activity: None    Problems taking medications regularly: No    Medication side effects: possibly some constipation    Diet: low salt      Will complete pap today. Would like to discuss bladder leakage.  Incontinence- daily pad; leakage  Menses monthly    Problem list and histories reviewed & adjusted, as indicated.  Additional history: as documented    Patient Active Problem List   Diagnosis     Other and unspecified anterior pituitary hyperfunction     Sarcoidosis     Menorrhagia     Family history of colon cancer     HYPERLIPIDEMIA LDL GOAL <160     Benign neoplasm of colon     Sleep concern     Family history of malignant neoplasm of thyroid gland     Morbid obesity, unspecified obesity type (H)     Benign essential hypertension     Past Surgical History:   Procedure Laterality Date     ARTHROSCOPY KNEE RT/LT  2009    Dr. Hodges, Right     C RAD RESEC TONSIL/PILLARS  ~     LAPAROSCOPIC CHOLECYSTECTOMY  9/10/2008    Dr. Aguayo     THORACOSCOPIC BIOPSY RIB  2004    Dr. Johnson       Social History   Substance Use Topics     Smoking status: Never Smoker     Smokeless tobacco: Never Used     Alcohol use Yes      Comment: two per year     Family History   Problem Relation Age of Onset     Hypertension Mother      Arthritis Mother      osteoarthritis     Cerebrovascular Disease Mother      Cancer - colorectal Father       at age 64; Dx @61     Diabetes Maternal Grandmother      Thyroid Disease Sister      Hypertension Sister      Hypertension Sister      Hypertension Sister      Hypertension Sister      Hypertension Sister          Current Outpatient Prescriptions   Medication Sig  Dispense Refill     Calcium Carbonate-Vitamin D (CALCIUM + D) 600-200 MG-UNIT per tablet Take 2 tablets by mouth 2 times daily.       Glucosamine-Chondroit-Vit C-Mn (GLUCOSAMINE CHONDR 1500 COMPLX) CAPS Take 2 capsules by mouth 2 times daily.       loratadine (CLARITIN) 10 MG tablet Take 10 mg by mouth daily       losartan (COZAAR) 25 MG tablet Take 1 tablet (25 mg) by mouth daily 90 tablet 2     MULTI-DAY VITAMINS OR None Entered       OMEGA 3 1000 MG OR CAPS daily       [DISCONTINUED] losartan (COZAAR) 25 MG tablet Take 1 tablet (25 mg) by mouth daily 30 tablet 1     Allergies   Allergen Reactions     No Known Drug Allergies      BP Readings from Last 3 Encounters:   06/27/18 122/80   05/29/18 136/80   02/13/18 142/82    Wt Readings from Last 3 Encounters:   06/27/18 264 lb (119.7 kg)   05/29/18 262 lb 9.6 oz (119.1 kg)   02/13/18 264 lb 11.2 oz (120.1 kg)                  Labs reviewed in EPIC    Reviewed and updated as needed this visit by clinical staff  Tobacco  Allergies  Meds  Problems  Med Hx  Surg Hx  Fam Hx  Soc Hx        Reviewed and updated as needed this visit by Provider  Allergies  Meds  Problems         ROS:  CONSTITUTIONAL:NEGATIVE for fever, chills, change in weight  Wt Readings from Last 3 Encounters:   06/27/18 264 lb (119.7 kg)   05/29/18 262 lb 9.6 oz (119.1 kg)   02/13/18 264 lb 11.2 oz (120.1 kg)     RESP:NEGATIVE for significant cough or SOB  BREAST: no changes on breast exam; mammo up to date.   CV: blood pressure reviewed; feeling better on current medication and dose  GI: colonoscopy up to date. No change in GI symptoms.   : still having regular menses; due for pap today  MUSCULOSKELETAL: NEGATIVE for significant arthralgias or myalgia  ENDOCRINE: anterior pituitary condition; followed by endorine  HEME/ALLERGY/IMMUNE: NEGATIVE for bleeding problems  PSYCHIATRIC: NEGATIVE for changes in mood or affect    OBJECTIVE:     /80 (BP Location: Right arm, Patient Position:  Chair, Cuff Size: Adult Large)  Pulse 77  Temp 99  F (37.2  C) (Oral)  Resp 16  Wt 264 lb (119.7 kg)  LMP 05/29/2018 (Exact Date)  BMI 41.35 kg/m2  Body mass index is 41.35 kg/(m^2).  GENERAL: healthy, alert and no distress  HENT: ear canals and TM's normal, nose and mouth without ulcers or lesions  NECK: no adenopathy, no asymmetry, masses, or scars and thyroid normal to palpation  RESP: lungs clear to auscultation - no rales, rhonchi or wheezes  CV: regular rate and rhythm, normal S1 S2, no S3 or S4, no murmur, click or rub, no peripheral edema and peripheral pulses strong  ABDOMEN: soft, nontender, obese, no hepatosplenomegaly, no masses and bowel sounds normal   (female): Pelvic: Normal external genitalia. Vaginal mucosa normal appearing;  Cervix nulliparous; pap technically difficult to get optimal specimen;  No adnexal masses or tenderness; Rectovaginal exam is without masses  MS: no gross musculoskeletal defects noted, no edema  SKIN: no suspicious lesions or rashes  NEURO: Normal strength and tone, mentation intact and speech normal  PSYCH: mentation appears normal, affect normal/bright      ASSESSMENT/PLAN:     (Z12.4) Screening for malignant neoplasm of cervix  (primary encounter diagnosis)  Comment: pap due  Plan: Pap imaged thin layer screen with HPV -         recommended age 30 - 65 years (select HPV order        below)          (I10) Benign essential hypertension  Comment: BP  changed to ARB and well tolerated; home cuff correlates with office reading.  Plan: losartan (COZAAR) 25 MG tablet          (R32) Urinary incontinence, unspecified type  Comment: wearing a pad daily; leakage of urine on a daily basis; discussed evaluation and treatment of options and encourage  evaluation;   Plan: UROLOGY ADULT REFERRAL        Reviewed limiting fluid intake later in the day;     (Z01.419) Encounter for gynecological examination without abnormal finding  Comment:   Lab Results   Component Value Date     "PAP NIL 05/26/2015        Plan: PAP done today.    (E66.01) Morbid obesity, unspecified obesity type (H)  Comment: Estimated body mass index is 41.35 kg/(m^2) as calculated from the following:    Height as of 5/29/18: 5' 7\" (1.702 m).    Weight as of this encounter: 264 lb (119.7 kg).    Plan: weight loss encouraged    (R79.89) Elevated LFTs  Comment: labs reviewed  Plan: Hepatic panel            FUTURE APPOINTMENTS:       - Make appointment with Urology specialist    Dorene Waldron MD  Internal Medicine   [facility]    "

## 2018-06-27 NOTE — PATIENT INSTRUCTIONS
The 10-year ASCVD risk score (Natividad SANTIAGO Jr et al., 2013) is: 2.1%    Values used to calculate the score:      Age: 53 years      Sex: Female      Is Non- : No      Diabetic: No      Tobacco smoker: No      Systolic Blood Pressure: 122 mmHg      Is BP treated: Yes      HDL Cholesterol: 64 mg/dL      Total Cholesterol: 222 mg/dL     Maximize diet and exercise

## 2018-06-27 NOTE — MR AVS SNAPSHOT
After Visit Summary   6/27/2018    Ne Chavis    MRN: [id number]           Patient Information     Date Of Birth          1965        Visit Information        Provider Department      6/27/2018 8:30 AM Dorene Waldron MD [facility]        Today's Diagnoses     Urinary incontinence, unspecified type    -  1    Benign essential hypertension        Encounter for gynecological examination without abnormal finding        Morbid obesity, unspecified obesity type (H)           Follow-ups after your visit        Additional Services     UROLOGY ADULT REFERRAL       Your provider has referred you to: [facility]: [facility] - [place] [phone]   [email or web address]  Dr. Andrea      Please be aware that coverage of these services is subject to the terms and limitations of your health insurance plan.  Call member services at your health plan with any benefit or coverage questions.      Please bring the following with you to your appointment:    (1) Any X-Rays, CTs or MRIs which have been performed.  Contact the facility where they were done to arrange for  prior to your scheduled appointment.    (2) List of current medications  (3) This referral request   (4) Any documents/labs given to you for this referral                  Who to contact     If you have questions or need follow up information about today's clinic visit or your schedule please contact [facility] directly at [phone].  Normal or non-critical lab and imaging results will be communicated to you by MyChart, letter or phone within 4 business days after the clinic has received the results. If you do not hear from us within 7 days, please contact the clinic through MyChart or phone. If you have a critical or abnormal lab result, we will notify you by phone as soon as possible.  Submit refill requests through [employer] or call your pharmacy and they will forward the  refill request to us. Please allow 3 business days for your refill to be completed.          Additional Information About Your Visit        From [employer] Information     [employer] gives you secure access to your electronic health record. If you see a primary care provider, you can also send messages to your care team and make appointments. If you have questions, please call your primary care clinic.  If you do not have a primary care provider, please call [phone] and they will assist you.        Care EveryWhere ID     This is your Care EveryWhere ID. This could be used by other organizations to access your [facility] medical records  [id number]        Your Vitals Were     Pulse Temperature Respirations Last Period BMI (Body Mass Index)       77 99  F (37.2  C) (Oral) 16 05/29/2018 (Exact Date) 41.35 kg/m2        Blood Pressure from Last 3 Encounters:   06/27/18 122/80   05/29/18 136/80   02/13/18 142/82    Weight from Last 3 Encounters:   06/27/18 264 lb (119.7 kg)   05/29/18 262 lb 9.6 oz (119.1 kg)   02/13/18 264 lb 11.2 oz (120.1 kg)              We Performed the Following     UROLOGY ADULT REFERRAL          Where to get your medicines      These medications were sent to [id number] - [place], [place] - [address]  [address] [facility] [address]     Phone:  [phone]     losartan 25 MG tablet          Primary Care Provider Office Phone # Fax #    Dorene Karie Waldron [phone]       [address] [facility]  [facility] [address]        Equal Access to Services     [facility]: Hadii aad ku hadasho Soomaali, waaxda luqadaha, qaybta kaalmada adeegyatara, ja adan . So [facility] [phone].    ATENCIÓN: Si habla español, tiene a rice disposición servicios gratuitos de asistencia lingüística. Llame al [phone].    We comply with applicable federal civil rights laws and [place] laws. We do not discriminate on the basis of race, color, national  origin, age, disability, sex, sexual orientation, or gender identity.            Thank you!     [facility] you [facility]  [facility]. Our goal is always to provide you with excellent care. Hearing back from our patients is one way we can continue to improve our services. Please take a few minutes to complete the written survey that you may receive in the mail after your visit with us. Thank you!             Your Updated Medication List - Protect others around you: Learn how to safely use, store and throw away your medicines at www.disposemymeds.org.          This list is accurate as of 6/27/18  9:43 AM.  Always use your most recent med list.                   Brand Name Dispense Instructions for use Diagnosis    calcium + D 600-200 MG-UNIT Tabs   Generic drug:  calcium carbonate-vitamin D      Take 2 tablets by mouth 2 times daily.        glucosamine chondroitin 1500 complex Caps      Take 2 capsules by mouth 2 times daily.        loratadine 10 MG tablet    CLARITIN     Take 10 mg by mouth daily        losartan 25 MG tablet    COZAAR    90 tablet    Take 1 tablet (25 mg) by mouth daily    Benign essential hypertension       MULTI-DAY VITAMINS PO      None Entered        omega 3 1000 MG Caps      daily

## 2018-06-28 LAB
ALBUMIN SERPL-MCNC: 4 G/DL (ref 3.4–5)
ALP SERPL-CCNC: 69 U/L (ref 40–150)
ALT SERPL W P-5'-P-CCNC: 42 U/L (ref 0–50)
AST SERPL W P-5'-P-CCNC: 22 U/L (ref 0–45)
BILIRUB DIRECT SERPL-MCNC: 0.1 MG/DL (ref 0–0.2)
BILIRUB SERPL-MCNC: 0.4 MG/DL (ref 0.2–1.3)
PROT SERPL-MCNC: 7.6 G/DL (ref 6.8–8.8)

## 2018-06-29 LAB
COPATH REPORT: NORMAL
PAP: NORMAL

## 2018-07-03 LAB
FINAL DIAGNOSIS: NORMAL
HPV HR 12 DNA CVX QL NAA+PROBE: NEGATIVE
HPV16 DNA SPEC QL NAA+PROBE: NEGATIVE
HPV18 DNA SPEC QL NAA+PROBE: NEGATIVE
SPECIMEN DESCRIPTION: NORMAL
SPECIMEN SOURCE CVX/VAG CYTO: NORMAL

## 2018-08-20 ASSESSMENT — ENCOUNTER SYMPTOMS
PANIC: 0
NERVOUS/ANXIOUS: 1
SPUTUM PRODUCTION: 0
DYSPNEA ON EXERTION: 0
DECREASED CONCENTRATION: 0
COUGH DISTURBING SLEEP: 0
HEMOPTYSIS: 0
POSTURAL DYSPNEA: 0
COUGH: 1
SHORTNESS OF BREATH: 0
WHEEZING: 1
DEPRESSION: 1
SNORES LOUDLY: 0
INSOMNIA: 1

## 2018-08-22 ENCOUNTER — OFFICE VISIT (OUTPATIENT)
Dept: UROLOGY | Facility: CLINIC | Age: 53
End: 2018-08-22
Payer: COMMERCIAL

## 2018-08-22 VITALS
HEIGHT: 67 IN | SYSTOLIC BLOOD PRESSURE: 122 MMHG | BODY MASS INDEX: 41.44 KG/M2 | DIASTOLIC BLOOD PRESSURE: 79 MMHG | WEIGHT: 264 LBS

## 2018-08-22 DIAGNOSIS — M62.89 PELVIC FLOOR DYSFUNCTION: ICD-10-CM

## 2018-08-22 DIAGNOSIS — N39.3 STRESS INCONTINENCE: ICD-10-CM

## 2018-08-22 DIAGNOSIS — R32 URINARY INCONTINENCE, UNSPECIFIED TYPE: Primary | ICD-10-CM

## 2018-08-22 LAB
ALBUMIN UR-MCNC: NEGATIVE MG/DL
APPEARANCE UR: CLEAR
BILIRUB UR QL STRIP: NEGATIVE
CAOX CRY #/AREA URNS HPF: ABNORMAL /HPF
COLOR UR AUTO: YELLOW
GLUCOSE UR STRIP-MCNC: NEGATIVE MG/DL
HGB UR QL STRIP: ABNORMAL
KETONES UR STRIP-MCNC: NEGATIVE MG/DL
LEUKOCYTE ESTERASE UR QL STRIP: NEGATIVE
MUCOUS THREADS #/AREA URNS LPF: PRESENT /LPF
NITRATE UR QL: NEGATIVE
PH UR STRIP: 7 PH (ref 5–7)
RBC #/AREA URNS AUTO: 1 /HPF (ref 0–2)
SOURCE: ABNORMAL
SP GR UR STRIP: 1.02 (ref 1–1.03)
SQUAMOUS #/AREA URNS AUTO: 2 /HPF (ref 0–1)
TRANS CELLS #/AREA URNS HPF: <1 /HPF (ref 0–1)
UROBILINOGEN UR STRIP-ACNC: 0.2 EU/DL (ref 0.2–1)
WBC #/AREA URNS AUTO: 1 /HPF (ref 0–5)

## 2018-08-22 PROCEDURE — 81003 URINALYSIS AUTO W/O SCOPE: CPT | Performed by: UROLOGY

## 2018-08-22 PROCEDURE — 87086 URINE CULTURE/COLONY COUNT: CPT | Performed by: UROLOGY

## 2018-08-22 PROCEDURE — 81015 MICROSCOPIC EXAM OF URINE: CPT | Performed by: UROLOGY

## 2018-08-22 PROCEDURE — 99243 OFF/OP CNSLTJ NEW/EST LOW 30: CPT | Performed by: UROLOGY

## 2018-08-22 ASSESSMENT — ENCOUNTER SYMPTOMS
CLAUDICATION: 0
WEIGHT LOSS: 0
INSOMNIA: 1
SKIN CHANGES: 0
DISTURBANCES IN COORDINATION: 0
SLEEP DISTURBANCES DUE TO BREATHING: 0
DECREASED LIBIDO: 0
DECREASED APPETITE: 0
DYSURIA: 0
EXERCISE INTOLERANCE: 0
COUGH DISTURBING SLEEP: 0
INCREASED ENERGY: 0
CONSTIPATION: 0
ARTHRALGIAS: 0
NUMBNESS: 0
SEIZURES: 0
NAIL CHANGES: 0
MEMORY LOSS: 0
LIGHT-HEADEDNESS: 0
SPUTUM PRODUCTION: 0
JAUNDICE: 0
HYPERTENSION: 0
DECREASED CONCENTRATION: 0
FEVER: 0
COUGH: 1
HYPOTENSION: 0
STIFFNESS: 0
BRUISES/BLEEDS EASILY: 0
HEMOPTYSIS: 0
NAUSEA: 0
EYE WATERING: 0
EYE PAIN: 0
BOWEL INCONTINENCE: 0
EYE IRRITATION: 0
RECTAL PAIN: 0
WHEEZING: 1
SHORTNESS OF BREATH: 0
WEAKNESS: 0
VOMITING: 0
DOUBLE VISION: 0
FLANK PAIN: 0
POOR WOUND HEALING: 0
POLYDIPSIA: 0
SORE THROAT: 0
SYNCOPE: 0
ALTERED TEMPERATURE REGULATION: 0
BLOATING: 0
NECK MASS: 0
NECK PAIN: 0
MUSCLE WEAKNESS: 0
TASTE DISTURBANCE: 0
POSTURAL DYSPNEA: 0
DYSPNEA ON EXERTION: 0
HEADACHES: 0
ABDOMINAL PAIN: 0
SPEECH CHANGE: 0
HEARTBURN: 0
BACK PAIN: 0
RECTAL BLEEDING: 0
EYE REDNESS: 0
DEPRESSION: 1
LEG SWELLING: 0
TROUBLE SWALLOWING: 0
ORTHOPNEA: 0
LEG PAIN: 0
FATIGUE: 0
SNORES LOUDLY: 0
PANIC: 0
PARALYSIS: 0
HOARSE VOICE: 0
HEMATURIA: 0
SINUS PAIN: 0
TACHYCARDIA: 0
MYALGIAS: 0
TREMORS: 0
BREAST PAIN: 0
HALLUCINATIONS: 0
MUSCLE CRAMPS: 0
DIZZINESS: 0
DIFFICULTY URINATING: 0
BREAST MASS: 0
SINUS CONGESTION: 0
JOINT SWELLING: 0
WEIGHT GAIN: 0
DIARRHEA: 0
HOT FLASHES: 0
POLYPHAGIA: 0
EXTREMITY NUMBNESS: 0
BLOOD IN STOOL: 0
SWOLLEN GLANDS: 0
NERVOUS/ANXIOUS: 1
CHILLS: 0
LOSS OF CONSCIOUSNESS: 0
NIGHT SWEATS: 0
SMELL DISTURBANCE: 0
TINGLING: 0
PALPITATIONS: 0

## 2018-08-22 ASSESSMENT — PAIN SCALES - GENERAL: PAINLEVEL: NO PAIN (0)

## 2018-08-22 NOTE — PROGRESS NOTES
August 22, 2018    Referring Provider: Dorene Waldron MD  [address] [place] [address]    Primary Care Provider: Dorene Waldron    CC: Urinary incontinence    HPI:  Ne Chavis is a 53 year old female G0 who presents for evaluation of her pelvic floor symptoms.  She has a long history of urinary incontinence-small volume that requires her to wear a liner.  More urine however when she wears a tampon during her menses.    Denies urinary urgency frequency, gross hematuria, UTI, vaginal bulge, constipation.  Not sexually active.  Denies history of dyspareunia.  Denies history of abuse.  Reports she feels safe at home.      Denies pelvic surgery.  Does have some knee arthritis    Past Medical History:   Diagnosis Date     Herpes zoster without mention of complication      Other and unspecified anterior pituitary hyperfunction 1990    saw endo; MRI; Elevated Prolactin level     Other and unspecified hyperlipidemia      Sarcoidosis 2004    BX per Pulmonary; Prednisone for a bit     Unspecified hemorrhoids without mention of complication      Past Surgical History:   Procedure Laterality Date     ARTHROSCOPY KNEE RT/LT  4/30/2009    Dr. Hodges, Right     C RAD RESEC TONSIL/PILLARS  ~2005     LAPAROSCOPIC CHOLECYSTECTOMY  9/10/2008    Dr. Aguayo     THORACOSCOPIC BIOPSY RIB  2004    Dr. Johnson     Social History     Social History     Marital status: Single     Spouse name: N/A     Number of children: 0     Years of education: 18     Occupational History      [employer]     Social History Main Topics     Smoking status: Never Smoker     Smokeless tobacco: Never Used     Alcohol use Yes      Comment: two per year     Drug use: No     Sexual activity: No     Other Topics Concern      Service No     Blood Transfusions No     Caffeine Concern No     Occupational Exposure No     Hobby Hazards No     Sleep Concern Yes     Had a sleep study done two weeks ago,  "results available next month     Stress Concern No     Weight Concern Yes     Was on Prednisone for Sarciodosis, gained weight     Special Diet No     Back Care Yes     sees chiropractor occasionally for \"tune-ups\" of tightness in back and headaches     Exercise No     Bike Helmet No     encouraged to wear helmet when riding     Seat Belt Yes     Self-Exams Yes     Parent/Sibling W/ Cabg, Mi Or Angioplasty Before 65f 55m? No     Social History Narrative     Family History   Problem Relation Age of Onset     Hypertension Mother      Arthritis Mother      osteoarthritis     Cerebrovascular Disease Mother      Cancer - colorectal Father       at age 64; Dx @61     Diabetes Maternal Grandmother      Thyroid Disease Sister      Hypertension Sister      Hypertension Sister      Hypertension Sister      Hypertension Sister      Hypertension Sister      Review of Systems     Constitutional:  Negative for fever, chills, weight loss, weight gain, fatigue, decreased appetite, night sweats, recent stressors, height gain, height loss, post-operative complications, incisional pain, hallucinations, increased energy, hyperactivity and confused.   HENT:  Negative for ear pain, hearing loss, tinnitus, nosebleeds, trouble swallowing, hoarse voice, mouth sores, sore throat, ear discharge, tooth pain, gum tenderness, taste disturbance, smell disturbance, hearing aid, bleeding gums, dry mouth, sinus pain, sinus congestion and neck mass.    Eyes:  Negative for double vision, pain, redness, eye pain, decreased vision, eye watering, eye bulging, eye dryness, flashing lights, spots, floaters, strabismus, tunnel vision, jaundice and eye irritation.   Respiratory:   Positive for cough and wheezing. Negative for hemoptysis, sputum production, shortness of breath, sleep disturbances due to breathing, snores loudly, dyspnea on exertion, cough disturbing sleep and postural dyspnea.    Cardiovascular:  Negative for chest pain, dyspnea on " exertion, palpitations, orthopnea, claudication, leg swelling, fingers/toes turn blue, hypertension, hypotension, syncope, history of heart murmur, chest pain on exertion, chest pain at rest, pacemaker, few scattered varicosities, leg pain, sleep disturbances due to breathing, tachycardia, light-headedness, exercise intolerance and edema.   Gastrointestinal:  Negative for heartburn, nausea, vomiting, abdominal pain, diarrhea, constipation, blood in stool, melena, rectal pain, bloating, hemorrhoids, bowel incontinence, jaundice, rectal bleeding, coffee ground emesis and change in stool.   Genitourinary:  Negative for bladder incontinence, dysuria, urgency, hematuria, flank pain, vaginal discharge, difficulty urinating, genital sores, dyspareunia, decreased libido, nocturia, voiding less frequently, arousal difficulty, abnormal vaginal bleeding, excessive menstruation, menstrual changes, hot flashes, vaginal dryness and postmenopausal bleeding.   Musculoskeletal:  Negative for myalgias, back pain, joint swelling, arthralgias, stiffness, muscle cramps, neck pain, bone pain, muscle weakness and fracture.   Skin:  Negative for nail changes, itching, poor wound healing, rash, hair changes, skin changes, acne, warts, poor wound healing, scarring, flaky skin, Raynaud's phenomenon, sensitivity to sunlight and skin thickening.   Neurological:  Negative for dizziness, tingling, tremors, speech change, seizures, loss of consciousness, weakness, light-headedness, numbness, headaches, disturbances in coordination, extremity numbness, memory loss, difficulty walking and paralysis.   Endo/Heme:  Negative for anemia, swollen glands and bruises/bleeds easily.   Psychiatric/Behavioral:  Positive for depression. Negative for hallucinations, memory loss, decreased concentration, mood swings and panic attacks.    Breast:  Negative for breast discharge, breast mass, breast pain and nipple retraction.   Endocrine:  Negative for altered  "temperature regulation, polyphagia, polydipsia, unwanted hair growth and change in facial hair.    Allergies   Allergen Reactions     No Known Drug Allergies      Current Outpatient Prescriptions   Medication     Calcium Carbonate-Vitamin D (CALCIUM + D) 600-200 MG-UNIT per tablet     Glucosamine-Chondroit-Vit C-Mn (GLUCOSAMINE CHONDR 1500 COMPLX) CAPS     loratadine (CLARITIN) 10 MG tablet     losartan (COZAAR) 25 MG tablet     magnesium 100 MG CAPS     MULTI-DAY VITAMINS OR     OMEGA 3 1000 MG OR CAPS     No current facility-administered medications for this visit.      /79  Ht 1.702 m (5' 7\")  Wt 119.7 kg (264 lb)  BMI 41.35 kg/m2 No LMP recorded. Body mass index is 41.35 kg/(m^2).  She is alert and oriented.  She is well groomed, comfortable in no acute distress. Normal mood and affect.   Non-labored breathing. Normocephalic without masses, lesions, obvious abnormalities. Abdomen is obese, soft, non-tender, non-distended, no CVAT.  Normal external female genitalia.  Negative ESST.  Speculum and bimanual exam are remarkable for inability to localize her pelvic floor musculature.  She has stage I support on supine strain.  Skin dry, warm to touch. No lower extremity edema.  Full ROM in extremities with normal gait.      Urine dip trace blood, 1 RBC/hpf    PVR was not done as patient declined a catheterized PVR    A/P: Ne Chavis is a 53 year old F with stress incontinence, pelvic floor dysfunction    We discussed PRESTON and the treatments to include observation, weight loss, pelvic floor physical therapy, urethral bulking, and surgeries most commonly synthetic midurethral sling or autologous rectus fascial sling.  We discussed the FDA mesh warning on vaginal mesh for prolapse and that this differs from the midurethral sling.  We further gave the patient a copy of the AUGS/SUFU position statement on the use of midurethral slings.       We discussed how her pelvic floor symptoms are related to the physical " exam findings and her pelvic floor myofascial dysfunction.  We discussed how the recommended treatment is dedicated pelvic floor therapy.  We discussed how the pelvic floor physical therapy works and patient is agreeable.  Referral was placed.      She is also encouraged to work on healthy weight loss     RTC 3 months, sooner if needed.  If microscopic hematuria or has persistent/worsening symptoms will plan on cystoscopy    40 minutes were spent with the patient today, > 50% in counseling and coordination of care    Geeta Andrea MD MPH    Urology    CC  Patient Care Team:  Kanu Waldron MD as PCP - General  KANU WALDRON

## 2018-08-22 NOTE — PATIENT INSTRUCTIONS
Websites with free information:    [employer] patient website: www.voicesforpfd.org    Total Control Program: www.totalcontrolprogram.com    Please work on healthy weight loss    We will let you know the results of the urine test    Please see one of the dedicated pelvic floor physical therapist ([facility] [phone])    Please return to see me in 3 months, sooner if needed    It was a pleasure meeting with you today.  Thank you for allowing me and my team the privilege of caring for you today.  YOU are the reason we are here, and I truly hope we provided you with the excellent service you deserve.  Please let us know if there is anything else we can do for you so that we can be sure you are leaving completely satisfied with your care experience.

## 2018-08-22 NOTE — LETTER
8/22/2018       RE: Ne Chavis  [address] [facility] [address]     Dear Colleague,    Thank you for referring your patient, Ne Chavis, to the [facility] at [facility]. Please see a copy of my visit note below.    August 22, 2018    Referring Provider: Dorene Waldron MD  [address] [place] [address]    Primary Care Provider: Dorene Waldron    CC: Urinary incontinence    HPI:  Ne Chavis is a 53 year old female G0 who presents for evaluation of her pelvic floor symptoms.  She has a long history of urinary incontinence-small volume that requires her to wear a liner.  More urine however when she wears a tampon during her menses.    Denies urinary urgency frequency, gross hematuria, UTI, vaginal bulge, constipation.  Not sexually active.  Denies history of dyspareunia.  Denies history of abuse.  Reports she feels safe at home.      Denies pelvic surgery.  Does have some knee arthritis    Past Medical History:   Diagnosis Date     Herpes zoster without mention of complication      Other and unspecified anterior pituitary hyperfunction 1990    saw endo; MRI; Elevated Prolactin level     Other and unspecified hyperlipidemia      Sarcoidosis 2004    BX per Pulmonary; Prednisone for a bit     Unspecified hemorrhoids without mention of complication      Past Surgical History:   Procedure Laterality Date     ARTHROSCOPY KNEE RT/LT  4/30/2009    Dr. Hodges, Right     C RAD RESEC TONSIL/PILLARS  ~2005     LAPAROSCOPIC CHOLECYSTECTOMY  9/10/2008    Dr. Aguayo     THORACOSCOPIC BIOPSY RIB  2004    Dr. Johnson     Social History     Social History     Marital status: Single     Spouse name: N/A     Number of children: 0     Years of education: 18     Occupational History      [employer]     Social History Main Topics     Smoking status: Never Smoker     Smokeless tobacco:  "Never Used     Alcohol use Yes      Comment: two per year     Drug use: No     Sexual activity: No     Other Topics Concern      Service No     Blood Transfusions No     Caffeine Concern No     Occupational Exposure No     Hobby Hazards No     Sleep Concern Yes     Had a sleep study done two weeks ago, results available next month     Stress Concern No     Weight Concern Yes     Was on Prednisone for Sarciodosis, gained weight     Special Diet No     Back Care Yes     sees chiropractor occasionally for \"tune-ups\" of tightness in back and headaches     Exercise No     Bike Helmet No     encouraged to wear helmet when riding     Seat Belt Yes     Self-Exams Yes     Parent/Sibling W/ Cabg, Mi Or Angioplasty Before 65f 55m? No     Social History Narrative     Family History   Problem Relation Age of Onset     Hypertension Mother      Arthritis Mother      osteoarthritis     Cerebrovascular Disease Mother      Cancer - colorectal Father       at age 64; Dx @61     Diabetes Maternal Grandmother      Thyroid Disease Sister      Hypertension Sister      Hypertension Sister      Hypertension Sister      Hypertension Sister      Hypertension Sister      Review of Systems     Constitutional:  Negative for fever, chills, weight loss, weight gain, fatigue, decreased appetite, night sweats, recent stressors, height gain, height loss, post-operative complications, incisional pain, hallucinations, increased energy, hyperactivity and confused.   HENT:  Negative for ear pain, hearing loss, tinnitus, nosebleeds, trouble swallowing, hoarse voice, mouth sores, sore throat, ear discharge, tooth pain, gum tenderness, taste disturbance, smell disturbance, hearing aid, bleeding gums, dry mouth, sinus pain, sinus congestion and neck mass.    Eyes:  Negative for double vision, pain, redness, eye pain, decreased vision, eye watering, eye bulging, eye dryness, flashing lights, spots, floaters, strabismus, tunnel vision, jaundice and " eye irritation.   Respiratory:   Positive for cough and wheezing. Negative for hemoptysis, sputum production, shortness of breath, sleep disturbances due to breathing, snores loudly, dyspnea on exertion, cough disturbing sleep and postural dyspnea.    Cardiovascular:  Negative for chest pain, dyspnea on exertion, palpitations, orthopnea, claudication, leg swelling, fingers/toes turn blue, hypertension, hypotension, syncope, history of heart murmur, chest pain on exertion, chest pain at rest, pacemaker, few scattered varicosities, leg pain, sleep disturbances due to breathing, tachycardia, light-headedness, exercise intolerance and edema.   Gastrointestinal:  Negative for heartburn, nausea, vomiting, abdominal pain, diarrhea, constipation, blood in stool, melena, rectal pain, bloating, hemorrhoids, bowel incontinence, jaundice, rectal bleeding, coffee ground emesis and change in stool.   Genitourinary:  Negative for bladder incontinence, dysuria, urgency, hematuria, flank pain, vaginal discharge, difficulty urinating, genital sores, dyspareunia, decreased libido, nocturia, voiding less frequently, arousal difficulty, abnormal vaginal bleeding, excessive menstruation, menstrual changes, hot flashes, vaginal dryness and postmenopausal bleeding.   Musculoskeletal:  Negative for myalgias, back pain, joint swelling, arthralgias, stiffness, muscle cramps, neck pain, bone pain, muscle weakness and fracture.   Skin:  Negative for nail changes, itching, poor wound healing, rash, hair changes, skin changes, acne, warts, poor wound healing, scarring, flaky skin, Raynaud's phenomenon, sensitivity to sunlight and skin thickening.   Neurological:  Negative for dizziness, tingling, tremors, speech change, seizures, loss of consciousness, weakness, light-headedness, numbness, headaches, disturbances in coordination, extremity numbness, memory loss, difficulty walking and paralysis.   Endo/Heme:  Negative for anemia, swollen glands  "and bruises/bleeds easily.   Psychiatric/Behavioral:  Positive for depression. Negative for hallucinations, memory loss, decreased concentration, mood swings and panic attacks.    Breast:  Negative for breast discharge, breast mass, breast pain and nipple retraction.   Endocrine:  Negative for altered temperature regulation, polyphagia, polydipsia, unwanted hair growth and change in facial hair.    Allergies   Allergen Reactions     No Known Drug Allergies      Current Outpatient Prescriptions   Medication     Calcium Carbonate-Vitamin D (CALCIUM + D) 600-200 MG-UNIT per tablet     Glucosamine-Chondroit-Vit C-Mn (GLUCOSAMINE CHONDR 1500 COMPLX) CAPS     loratadine (CLARITIN) 10 MG tablet     losartan (COZAAR) 25 MG tablet     magnesium 100 MG CAPS     MULTI-DAY VITAMINS OR     OMEGA 3 1000 MG OR CAPS     No current facility-administered medications for this visit.      /79  Ht 1.702 m (5' 7\")  Wt 119.7 kg (264 lb)  BMI 41.35 kg/m2 No LMP recorded. Body mass index is 41.35 kg/(m^2).  She is alert and oriented.  She is well groomed, comfortable in no acute distress. Normal mood and affect.   Non-labored breathing. Normocephalic without masses, lesions, obvious abnormalities. Abdomen is obese, soft, non-tender, non-distended, no CVAT.  Normal external female genitalia.  Negative ESST.  Speculum and bimanual exam are remarkable for inability to localize her pelvic floor musculature.  She has stage I support on supine strain.  Skin dry, warm to touch. No lower extremity edema.  Full ROM in extremities with normal gait.      Urine dip trace blood, 1 RBC/hpf    PVR was not done as patient declined a catheterized PVR    A/P: Ne Chavis is a 53 year old F with stress incontinence, pelvic floor dysfunction    We discussed PRESTON and the treatments to include observation, weight loss, pelvic floor physical therapy, urethral bulking, and surgeries most commonly synthetic midurethral sling or autologous rectus fascial " sling.  We discussed the FDA mesh warning on vaginal mesh for prolapse and that this differs from the midurethral sling.  We further gave the patient a copy of the AUGS/SUFU position statement on the use of midurethral slings.       We discussed how her pelvic floor symptoms are related to the physical exam findings and her pelvic floor myofascial dysfunction.  We discussed how the recommended treatment is dedicated pelvic floor therapy.  We discussed how the pelvic floor physical therapy works and patient is agreeable.  Referral was placed.      She is also encouraged to work on healthy weight loss     RTC 3 months, sooner if needed.  If microscopic hematuria or has persistent/worsening symptoms will plan on cystoscopy    40 minutes were spent with the patient today, > 50% in counseling and coordination of care    Geeta Andrea MD MPH    Urology    CC  Patient Care Team:  Kanu Waldron MD as PCP - General  KANU WALDRON

## 2018-08-22 NOTE — MR AVS SNAPSHOT
After Visit Summary   8/22/2018    Ne Chavis    MRN: [id number]           Patient Information     Date Of Birth          1965        Visit Information        Provider Department      8/22/2018 2:30 PM Getea Andrea MD [facility]        Today's Diagnoses     Urinary incontinence, unspecified type    -  1    Stress incontinence        Pelvic floor dysfunction          Care Instructions    Websites with free information:    American Urogynec[employer] Society patient website: www.[email or web address]    Total Control Program: www.totalcontrolprogram.com    Please work on healthy weight loss    We will let you know the results of the urine test    Please see one of the dedicated pelvic floor physical therapist ([facility] [phone])    Please return to see me in 3 months, sooner if needed    It was a pleasure meeting with you today.  Thank you for allowing me and my team the privilege of caring for you today.  YOU are the reason we are here, and I truly hope we provided you with the excellent service you deserve.  Please let us know if there is anything else we can do for you so that we can be sure you are leaving completely satisfied with your care experience.              Follow-ups after your visit        Your next 10 appointments already scheduled     Nov 14, 2018  2:00 PM CST   Cystoscopy with Geeta Andrea MD,  CYF   [facility] [place] ([facility])    [address]  [address]  [facility] [id number] [phone]              Who to contact     If you have questions or need follow up information about today's clinic visit or your schedule please contact [facility]OLOG[facility] directly at [phone].  Normal or non-critical lab and imaging results will be communicated to you by MyChart, letter or phone within 4 business  "days after the clinic has received the results. If you do not hear from us within 7 days, please contact the clinic through [employer] or phone. If you have a critical or abnormal lab result, we will notify you by phone as soon as possible.  Submit refill requests through [employer] or call your pharmacy and they will forward the refill request to us. Please allow 3 business days for your refill to be completed.          Additional Information About Your Visit        [employer] Information     [employer] gives you secure access to your electronic health record. If you see a primary care provider, you can also send messages to your care team and make appointments. If you have questions, please call your primary care clinic.  If you do not have a primary care provider, please call [phone] and they will assist you.        Care EveryWhere ID     This is your Care EveryWhere ID. This could be used by other organizations to access your [place] medical records  [id number]        Your Vitals Were     Height BMI (Body Mass Index)                1.702 m (5' 7\") 41.35 kg/m2           Blood Pressure from Last 3 Encounters:   08/22/18 122/79   06/27/18 122/80   05/29/18 136/80    Weight from Last 3 Encounters:   08/22/18 119.7 kg (264 lb)   06/27/18 119.7 kg (264 lb)   05/29/18 119.1 kg (262 lb 9.6 oz)              We Performed the Following     UA without Microscopic     Urine Culture Aerobic Bacterial [RXB210]     Urine Micro Urologic Phys        Primary Care Provider Office Phone # Fax #    Dorene Karie Waldron [phone] [address]  [facility] [address]        Equal Access to Services     [facility]: Hadii aad ku hadasho Soomaali, waaxda luqadaha, qaybta kaalmada juan ramon, ja leach. So [facility] [phone].    ATENCIÓN: Si habla español, tiene a rice disposición servicios gratuitos de asistencia lingüística. Llame al [phone].    We comply with applicable federal " civil rights laws and [place] laws. We do not discriminate on the basis of race, color, national origin, age, disability, sex, sexual orientation, or gender identity.            Thank you!     Thank you for choosing [facility] [place]  for your care. Our goal is always to provide you with excellent care. Hearing back from our patients is one way we can continue to improve our services. Please take a few minutes to complete the written survey that you may receive in the mail after your visit with us. Thank you!             Your Updated Medication List - Protect others around you: Learn how to safely use, store and throw away your medicines at www.disposemymeds.org.          This list is accurate as of 8/22/18  3:06 PM.  Always use your most recent med list.                   Brand Name Dispense Instructions for use Diagnosis    calcium + D 600-200 MG-UNIT Tabs   Generic drug:  calcium carbonate-vitamin D      Take 2 tablets by mouth 2 times daily.        glucosamine chondroitin 1500 complex Caps      Take 2 capsules by mouth 2 times daily.        loratadine 10 MG tablet    CLARITIN     Take 10 mg by mouth daily        losartan 25 MG tablet    COZAAR    90 tablet    Take 1 tablet (25 mg) by mouth daily    Benign essential hypertension       magnesium 100 MG Caps           MULTI-DAY VITAMINS PO      None Entered        omega 3 1000 MG Caps      daily

## 2018-08-23 LAB
BACTERIA SPEC CULT: NO GROWTH
Lab: NORMAL
SPECIMEN SOURCE: NORMAL

## 2018-09-04 PROBLEM — M62.89 PELVIC FLOOR DYSFUNCTION: Status: ACTIVE | Noted: 2018-09-04

## 2018-09-04 PROBLEM — N39.3 STRESS INCONTINENCE: Status: ACTIVE | Noted: 2018-09-04

## 2018-10-18 ENCOUNTER — THERAPY VISIT (OUTPATIENT)
Dept: PHYSICAL THERAPY | Facility: CLINIC | Age: 53
End: 2018-10-18
Payer: COMMERCIAL

## 2018-10-18 DIAGNOSIS — N39.3 STRESS INCONTINENCE: Primary | ICD-10-CM

## 2018-10-18 DIAGNOSIS — M62.89 PELVIC FLOOR DYSFUNCTION: ICD-10-CM

## 2018-10-18 PROCEDURE — 97530 THERAPEUTIC ACTIVITIES: CPT | Mod: GP | Performed by: PHYSICAL THERAPIST

## 2018-10-18 PROCEDURE — 97161 PT EVAL LOW COMPLEX 20 MIN: CPT | Mod: GP | Performed by: PHYSICAL THERAPIST

## 2018-10-18 PROCEDURE — 97112 NEUROMUSCULAR REEDUCATION: CPT | Mod: GP | Performed by: PHYSICAL THERAPIST

## 2018-10-18 NOTE — PROGRESS NOTES
[facility] Initial Evaluation  Subjective:  The history is provided by the patient.        Patient is a 53 year old  female who presents to [facility] with mixed urinary incontinence.  Patient states her problems began about 1-2 years ago with gradual onset.  Patient states overall her health is good.  PMH includes:  Asthma, HBP, incontinence, migraines/headaches, OA, and overweight.  Past surgical history includes Bilateral torn meniscus with repair.    History of current episode:    Onset date/MD order: 1-2 years ago.    CC/Present symptoms: Mixed incontinence.   Pain rating (0-10): 0/10  Conditioning is improving/unchanging/worsening: unchanging    Pelvic/Abdominal Surgeries: Gall bladder  Current occupation:   Current activity: Inactive  Goals: Stop leaking  Red flags:none      Urination:  Do you leak on the way to the bathroom or with a strong urge to void? Yes   Do you leak with cough,sneeze, jumping, running?Yes   Any other activities that cause leaking? Yes   Do you have triggers that make you feel you can't wait to go to the bathroom? No.  Type of pad and number used per day? Poise pad #2 one/day  When you leak what is the amount? small  How long can you delay the need to urinate? 31 - 60 minutes.   Do you feel excessive pressure in pelvic floor: no    Frequency of daytime urination: unaware  Frequency of nighttime urination:0-1  Can you stop the flow of urine when on the toilet? No  Is the volume of urine passed usually: medium. (8sec rule= 250ml with average bladder storing 400-600ml)  Do you strain to pass urine? No  Do you have a slow or hesitant urinary stream? No  Do you have difficulty initiating the urine stream? No  Is urination painful? No  How many bladder infections have you had in last 12 months?0  Fluid intake(one glass is 8oz or one cup)  80 oz.glasses/day,  0 caffinated glasses/day  0 alcohol glasses/day.  Bowel habits:  Frequency of bowel movements? 1 times a  "day  Consistancy of stool? soft formed (takes magnesium and metamucil)  Do you ignore the urge to defecate? No  Do you strain to pass stool? No  Pelvic Pain:  Do you have any pelvic pain with intercourse, exams, use of tampons? No  Are you sexually active?No  Have you ever been worried for your physical safety? No  Have you practiced the PF(kegel) exercises for 4 or more weeks? no  Marinoff Scale:Level  NA  (Level 3: Abstinence from intercourse because of severe pain. Level 2: Painful intercourse which limites frequency of activity. Level 1: Painful intercourse not severe enough to prevent activity.)    Treatment/Education provided this session (see flow sheet for additional information):    Self Care Management/Patient education (12 min): Today's session consisted of education regarding pelvic floor muscle anatomy, normal bladder function, urge suppression techniques and/or relaxation techniques as indicated, and instruction in how to complete a bladder diary for assessment next visit. Depending on patient presentation, timed voids, double voids, and proper fluid/fiber intake discussed. Pt was instructed in the pathoanatomy of the pelvic floor utilizing pelvic model.  We discussed what pelvic floor physical therapy is, components of exam, and typical patient progression. Prior to internal PFM exam,  patient was told that they were in control of exam progression, and if at any time were uncomfortable and wished to discontinue we would.        NMR (12 min): Patient instruction in correct isolation of PFM/TrA then coordinated contraction of \"canister\" muscles: diaphragm, Transverse Abdominals, PFM, and posterior spine musculature.  Educated in initiating contraction from anal sphinctor, elevating through PFM, contraction of TrA, while exhaling slowly.  Cued for maximal and sub-maximal contractions, using hip rotators and adductors for overflow if needed.  Pt required cuing for each progression, with decreasing " feedback as tolerated.  Instructed to avoid Valsalva/increased inter-abdominal pressure.  Pt cued through verbal, tactile (internal and external), and visual cuing utilizing biofeedback.  For HEP, pt encouraged to separate each phase of the exercise, then work towards coordination of the phases together with good breath technique with goal of developing good neuromuscular control of area.        NMR pelvic pain (12 min):  Pt education regarding contributing factors to pelvic pain and dysfunction related to overactivity in the pelvic floor.  Included resources for relaxed awareness and pelvic floor quieting techniqes.  Extra time spent describing pelvic floor muscle exam and treatment plan/goals, with attention to potential history that may contribute to current symptoms.  Included resources for home release techniques using dilators and/or thera wand as indicated.  Recommended partner involvement if available.  Discussed in detail potential physiological and behavioral components of pelvic pain.  Demonstrated/performed techniques for input to painful area while using visualization and relaxation.                                     Objective:  System                                 Pelvic Dysfunction Evaluation:    Bladder/Pelvic Problems:  Mixed urinary incontinence  Storage Problem:  Mixed incontinence          Flexibility:    Tightness present at:Adductors; Iliopsoas; Hamstrings; Piriformis and Gluteals  obturator internus  Abdominal Wall:      Trigger Points:  Iliopsoas    Pelvic Clock Exam:    Ischiocavernosis pain:  -  Bulbocavernosis pain:  -  Transverse Perineal:  -  Levator ANI:  -  Perineal Body:  -  SI Provocation:  NA        Reflex Testing:  NA    External Assessment:    Skin Condition:  Normal    Bearing Down/Coughing:  Normal  Tissue Symmetry:  Normal  Introitus:  Normal  Muscle Contraction/Perineal Mobility:  No movement  Internal Assessment:  Internal assessment pelvic: No pain or MFR.  Sensory Exam:   Normal  Contraction/Grade:  Fllicker muscles stretched (1)  Accessory Muscle use-Abdominals:  Yes  Accessory Muscle use-Gluteals:  Yes  Accessory Muscle use-Adductors:  Yes    SEMG Biofeedback:    Equipment:  Surface EMG    Suraface electrode placement--Perianal:  Perianal  Baseline EMG PM:  Resting baseline 1.1uV    Peak pelvic muscle contraction:  2.9uV  Sustained contraction:  2.7uV  EMG interpretation to fatigue:  Less than3 seconds  Position:  SittingAdditional History:    Number of Pregnancies: 0  Number of Live Births: 0  Caffeine Consumption:  0                     General     ROS    Assessment/Plan:    Patient is a 53 year old female with incontinence complaints.    Patient has the following significant findings with corresponding treatment plan.                Diagnosis 1:  Mixed urinary incontinence Decreased strength - therapeutic exercise, therapeutic activities and home program  Impaired muscle performance - biofeedback, neuro re-education and home program  Decreased function - therapeutic activities and home program  Diagnosis 2:  Pelvic floor weakness   Decreased strength - therapeutic exercise, therapeutic activities and home program  Impaired muscle performance - biofeedback, neuro re-education and home program  Decreased function - therapeutic activities and home program    Therapy Evaluation Codes:   1) History comprised of:   Personal factors that impact the plan of care:      None.    Comorbidity factors that impact the plan of care are:      Asthma, Bowel/bladder changes, High blood pressure, Migraines/headaches, Osteoarthritis and Overweight.     Medications impacting care: High blood pressure.  2) Examination of Body Systems comprised of:   Body structures and functions that impact the plan of care:      Knee.   Activity limitations that impact the plan of care are:      Jumping, Lifting, Sports, Stress incontinence, Urgency, Urge incontinence and Urinary incontinence.  3) Clinical  presentation characteristics are:   Stable/Uncomplicated.  4) Decision-Making    Low complexity using standardized patient assessment instrument and/or measureable assessment of functional outcome.  Cumulative Therapy Evaluation is: Low complexity.    Previous and current functional limitations:  (See Goal Flow Sheet for this information)    Short term and Long term goals: (See Goal Flow Sheet for this information)     Communication ability:  Patient appears to be able to clearly communicate and understand verbal and written communication and follow directions correctly.  Treatment Explanation - The following has been discussed with the patient:   RX ordered/plan of care  Anticipated outcomes  Possible risks and side effects  This patient would benefit from PT intervention to resume normal activities.   Rehab potential is good.    Frequency:  1 X week, once daily  Duration:  for 1 weeks tapering to every other week for total 8-10 visits as needed  Discharge Plan:  Achieve all LTG.  Independent in home treatment program.  Reach maximal therapeutic benefit.    Please refer to the daily flowsheet for treatment today, total treatment time and time spent performing 1:1 timed codes.

## 2018-10-18 NOTE — MR AVS SNAPSHOT
After Visit Summary   10/18/2018    Ne Chavis    MRN: [id number]           Patient Information     Date Of Birth          1965        Visit Information        Provider Department      10/18/2018 3:15 PM Tequila Menjivar PT [place] [facility]        Today's Diagnoses     Stress incontinence    -  1    Pelvic floor dysfunction           Follow-ups after your visit        Who to contact     If you have questions or need follow up information about today's clinic visit or your schedule please contact [place] [facility] directly at [phone].  Normal or non-critical lab and imaging results will be communicated to you by "[employer]hart, letter or phone within 4 business days after the clinic has received the results. If you do not hear from us within 7 days, please contact the clinic through [employer] or phone. If you have a critical or abnormal lab result, we will notify you by phone as soon as possible.  Submit refill requests through [employer] or call your pharmacy and they will forward the refill request to us. Please allow 3 business days for your refill to be completed.          Additional Information About Your Visit        MyChart Information     [employer] gives you secure access to your electronic health record. If you see a primary care provider, you can also send messages to your care team and make appointments. If you have questions, please call your primary care clinic.  If you do not have a primary care provider, please call [phone] and they will assist you.        Care EveryWhere ID     This is your Care EveryWhere ID. This could be used by other organizations to access your [place] medical records  [id number]         Blood Pressure from Last 3 Encounters:   08/22/18 122/79   06/27/18 122/80   05/29/18 136/80    Weight from Last 3 Encounters:   08/22/18 119.7 kg (264 lb)   06/27/18 119.7 kg (264 lb)    05/29/18 119.1 kg (262 lb 9.6 oz)              We Performed the Following     HC PT EVAL, LOW COMPLEXITY     NEUROMUSCULAR RE-EDUCATION     THERAPEUTIC ACTIVITIES        Primary Care Provider Office Phone # Fax #    Dorene Waldron [phone] [address] [address]        Equal Access to Services     [facility]: Hadii aad ku hadasho Soomaali, waaxda luqadaha, qaybta kaalmada adeegyada, waxay idiin hayaan adeeg kharash laEssieaan . So [facility] [phone].    ATENCIÓN: Si habla español, tiene a rice disposición servicios gratuitos de asistencia lingüística. Llame al [phone].    We comply with applicable federal civil rights laws and [place] laws. We do not discriminate on the basis of race, color, national origin, age, disability, sex, sexual orientation, or gender identity.            Thank you!     Thank you for choosing [place] [facility]ER[facility]  for your care. Our goal is always to provide you with excellent care. Hearing back from our patients is one way we can continue to improve our services. Please take a few minutes to complete the written survey that you may receive in the mail after your visit with us. Thank you!             Your Updated Medication List - Protect others around you: Learn how to safely use, store and throw away your medicines at www.disposemymeds.org.          This list is accurate as of 10/18/18  4:40 PM.  Always use your most recent med list.                   Brand Name Dispense Instructions for use Diagnosis    calcium + D 600-200 MG-UNIT Tabs   Generic drug:  calcium carbonate-vitamin D      Take 2 tablets by mouth 2 times daily.        glucosamine chondroitin 1500 complex Caps      Take 2 capsules by mouth 2 times daily.        loratadine 10 MG tablet    CLARITIN     Take 10 mg by mouth daily        losartan 25 MG tablet    COZAAR    90 tablet    Take 1 tablet (25 mg) by mouth daily    Benign essential  hypertension       magnesium 100 MG Caps           MULTI-DAY VITAMINS PO      None Entered        omega 3 1000 MG Caps      daily

## 2018-11-21 ENCOUNTER — TRANSFERRED RECORDS (OUTPATIENT)
Dept: HEALTH INFORMATION MANAGEMENT | Facility: CLINIC | Age: 53
End: 2018-11-21

## 2018-12-12 ENCOUNTER — OFFICE VISIT (OUTPATIENT)
Dept: UROLOGY | Facility: CLINIC | Age: 53
End: 2018-12-12
Payer: COMMERCIAL

## 2018-12-12 VITALS
HEART RATE: 76 BPM | HEIGHT: 67 IN | DIASTOLIC BLOOD PRESSURE: 80 MMHG | SYSTOLIC BLOOD PRESSURE: 124 MMHG | BODY MASS INDEX: 34.37 KG/M2 | WEIGHT: 219 LBS | OXYGEN SATURATION: 98 %

## 2018-12-12 DIAGNOSIS — N32.9 LESION OF BLADDER: ICD-10-CM

## 2018-12-12 DIAGNOSIS — N39.3 URINARY, INCONTINENCE, STRESS FEMALE: ICD-10-CM

## 2018-12-12 DIAGNOSIS — R32 URINARY INCONTINENCE, UNSPECIFIED TYPE: Primary | ICD-10-CM

## 2018-12-12 DIAGNOSIS — M62.89 PELVIC FLOOR DYSFUNCTION: ICD-10-CM

## 2018-12-12 LAB
ALBUMIN UR-MCNC: NEGATIVE MG/DL
APPEARANCE UR: CLEAR
BILIRUB UR QL STRIP: NEGATIVE
COLOR UR AUTO: YELLOW
GLUCOSE UR STRIP-MCNC: NEGATIVE MG/DL
HGB UR QL STRIP: NEGATIVE
KETONES UR STRIP-MCNC: NEGATIVE MG/DL
LEUKOCYTE ESTERASE UR QL STRIP: NEGATIVE
NITRATE UR QL: NEGATIVE
PH UR STRIP: 7 PH (ref 5–7)
SOURCE: NORMAL
SP GR UR STRIP: 1.01 (ref 1–1.03)
UROBILINOGEN UR STRIP-ACNC: 0.2 EU/DL (ref 0.2–1)

## 2018-12-12 PROCEDURE — 81003 URINALYSIS AUTO W/O SCOPE: CPT | Performed by: UROLOGY

## 2018-12-12 PROCEDURE — 88112 CYTOPATH CELL ENHANCE TECH: CPT | Performed by: UROLOGY

## 2018-12-12 PROCEDURE — 52000 CYSTOURETHROSCOPY: CPT | Performed by: UROLOGY

## 2018-12-12 ASSESSMENT — PAIN SCALES - GENERAL: PAINLEVEL: NO PAIN (0)

## 2018-12-12 ASSESSMENT — MIFFLIN-ST. JEOR: SCORE: 1626.01

## 2018-12-12 NOTE — PROGRESS NOTES
"December 12, 2018    Return visit    Patient returns today for follow up for cystoscopy as symptoms are no better but also states that she did not really do the PT.  She denies any changes in her health since last visit.    /80 (BP Location: Left arm, Patient Position: Sitting, Cuff Size: Adult Regular)   Pulse 76   Ht 1.702 m (5' 7\")   Wt 99.3 kg (219 lb)   SpO2 98%   BMI 34.30 kg/m    She is comfortable, in no distress, non-labored breathing.  Abdomen is soft, non-tender, non-distended.  Normal external female genitalia.  Negative CST.  Pelvic exam is remarkable for inability to localize pelvic floor musculature.    Cystoscopy Note: After informed consent was obtained patient was prepped and draped in the standard fashion.  The flexible cystoscope was inserted into a normal appearing urethral meatus.  The urothelium was carefully examined and there were a couple very small non erythematous lesions but no obvious tumors, masses, stones, foreign bodies, or other urothelial abnormalities noted.  Bilateral ureteral orifices were noted in the normal orthotopic position and both effluxed clear urine.  The cystoscope was retroflexed and the bladder neck was unremarkable.  The urethra was carefully examined upon removing the cystoscope and was unremarkable.  Patient tolerated the procedure without complications noted.      A/P: 53 year old F with stress incontinence, pelvic floor dysfunction and non specific bladder lesion    Urine cytology today.  We discussed options of repeating cystoscopy in a few months versus proceeding to OR to biopsy.  Patient is comfortable to repeat cystoscopy in a few months as long as cytology is negative    Pelvic floor therapy    RTC 3-4 months for cystoscopy, sooner if needed    Geeta Andrea MD MPH   of Urology    CC  Patient Care Team:  Dorene Waldron MD as PCP - General                  "

## 2018-12-12 NOTE — NURSING NOTE
Chief Complaint   Patient presents with     urinary incontinence     Pt is here for cystoscopy due to urinary incontinence.    UA RESULTS:  Recent Labs   Lab Test 12/12/18  1533 08/22/18  1510   COLOR Yellow  --    APPEARANCE Clear  --    URINEGLC Negative  --    URINEBILI Negative  --    URINEKETONE Negative  --    SG 1.010  --    UBLD Negative  --    URINEPH 7.0  --    PROTEIN Negative  --    UROBILINOGEN 0.2  --    NITRITE Negative  --    LEUKEST Negative  --    RBCU  --  1   WBCU  --  1     Prior to the start of the procedure and with procedural staff participation, I verbally confirmed the patient s identity using two indicators, relevant allergies, that the procedure was appropriate and matched the consent or emergent situation, and that the correct equipment/implants were available. Immediately prior to starting the procedure I conducted the Time Out with the procedural staff and re-confirmed the patient s name, procedure, and site/side. I have wiped the patient off with the povidone-Iodine solution, draped them,  used Lidocaine hydrochloride jelly, and instilled sterile water into the bladder. (The Joint Commission universal protocol was followed.)  Yes    Sedation (Moderate or Deep): None  Valery Maza, TICO

## 2018-12-12 NOTE — LETTER
"12/12/2018       RE: Ne Chavis  [address] [address] [address]     Dear Colleague,    Thank you for referring your patient, Ne Chavis, to the [facility] [place] at [facility]. Please see a copy of my visit note below.    December 12, 2018    Return visit    Patient returns today for follow up for cystoscopy as symptoms are no better but also states that she did not really do the PT.  She denies any changes in her health since last visit.    /80 (BP Location: Left arm, Patient Position: Sitting, Cuff Size: Adult Regular)   Pulse 76   Ht 1.702 m (5' 7\")   Wt 99.3 kg (219 lb)   SpO2 98%   BMI 34.30 kg/m     She is comfortable, in no distress, non-labored breathing.  Abdomen is soft, non-tender, non-distended.  Normal external female genitalia.  Negative CST.  Pelvic exam is remarkable for inability to localize pelvic floor musculature.    Cystoscopy Note: After informed consent was obtained patient was prepped and draped in the standard fashion.  The flexible cystoscope was inserted into a normal appearing urethral meatus.  The urothelium was carefully examined and there were a couple very small non erythematous lesions but no obvious tumors, masses, stones, foreign bodies, or other urothelial abnormalities noted.  Bilateral ureteral orifices were noted in the normal orthotopic position and both effluxed clear urine.  The cystoscope was retroflexed and the bladder neck was unremarkable.  The urethra was carefully examined upon removing the cystoscope and was unremarkable.  Patient tolerated the procedure without complications noted.      A/P: 53 year old F with stress incontinence, pelvic floor dysfunction and non specific bladder lesion    Urine cytology today.  We discussed options of repeating cystoscopy in a few months versus proceeding to OR to biopsy.  Patient is comfortable to repeat cystoscopy in a few months " as long as cytology is negative    Pelvic floor therapy    RTC 3-4 months for cystoscopy, sooner if needed    Geeta Andrea MD MPH   of Urology      CC  Patient Care Team:  Dorene Waldron MD as PCP - General

## 2018-12-12 NOTE — PATIENT INSTRUCTIONS
"We will let you know the urine test results    Please see one of the dedicated pelvic floor physical therapist ([facility] [phone])    Please return to see me in 3-4 months for repeat cystoscopy, sooner if needed    It was a pleasure meeting with you today.  Thank you for allowing me and my team the privilege of caring for you today.  YOU are the reason we are here, and I truly hope we provided you with the excellent service you deserve.  Please let us know if there is anything else we can do for you so that we can be sure you are leaving completely satisfied with your care experience.    AFTER YOUR CYSTOSCOPY  ?  ?  You have just completed a cystoscopy, or \"cysto\", which allowed your physician to learn more about your bladder (or to remove a stent placed after surgery). We suggest that you continue to avoid caffeine, fruit juice, and alcohol for the next 24 hours, however, you are encouraged to return to your normal activities.  ?  ?  A few things that are considered normal after your cystoscopy:  ?  * small amount of bleeding (or spotting) that clears within the next 24 hours  ?  * slight burning sensation with urination  ?  * sensation of needing to void (urinate) more frequently  ?  * the feeling of \"air\" in your urine  ?  * mild discomfort that is relieved with Tylenol    * bladder spasms  ?  ?  ?  Please contact our office promptly if you:  ?  * develop a fever above 101 degrees  ?  * are unable to urinate  ?  * develop bright red blood that does not stop  ?  * experience severe pain or swelling  ?  ?  ?  And of course, please contact our office with any concerns or questions [phone]  ?    AFTER YOUR CYSTOSCOPY        You have just completed a cystoscopy, or \"cysto\", which allowed your physician to learn more about your bladder (or to remove a stent placed after surgery). We suggest that you continue to avoid caffeine, fruit juice, and alcohol for the next 24 " "hours, however, you are encouraged to return to your normal activities.         A few things that are considered normal after your cystoscopy:     * Small amount of bleeding (or spotting) that clears within the next 24 hours     * Slight burning sensation with urination     * Sensation to of needing to avoid more frequently     * The feeling of \"air\" in your urine     * Mild discomfort that is relieved with Tylenol        Please contact our office promptly if you:     * Develop a fever above 101 degrees     * Are unable to urinate     * Develop bright red blood that does not stop     * Severe pain or swelling         Please contact our office with any concerns or questions @[email or web address].  "

## 2018-12-14 LAB — COPATH REPORT: NORMAL

## 2018-12-16 PROBLEM — N32.9 LESION OF BLADDER: Status: ACTIVE | Noted: 2018-12-16

## 2019-01-08 ENCOUNTER — OFFICE VISIT (OUTPATIENT)
Dept: FAMILY MEDICINE | Facility: CLINIC | Age: 54
End: 2019-01-08
Payer: COMMERCIAL

## 2019-01-08 VITALS
TEMPERATURE: 98.5 F | OXYGEN SATURATION: 96 % | HEIGHT: 67 IN | RESPIRATION RATE: 16 BRPM | SYSTOLIC BLOOD PRESSURE: 118 MMHG | WEIGHT: 213.1 LBS | DIASTOLIC BLOOD PRESSURE: 72 MMHG | HEART RATE: 71 BPM | BODY MASS INDEX: 33.45 KG/M2

## 2019-01-08 DIAGNOSIS — I10 BENIGN ESSENTIAL HYPERTENSION: ICD-10-CM

## 2019-01-08 DIAGNOSIS — F43.0 STRESS REACTION: ICD-10-CM

## 2019-01-08 DIAGNOSIS — E66.01 MORBID OBESITY, UNSPECIFIED OBESITY TYPE (H): Primary | ICD-10-CM

## 2019-01-08 PROCEDURE — 99214 OFFICE O/P EST MOD 30 MIN: CPT | Performed by: INTERNAL MEDICINE

## 2019-01-08 RX ORDER — LOSARTAN POTASSIUM 25 MG/1
25 TABLET ORAL DAILY
Qty: 90 TABLET | Refills: 1 | Status: SHIPPED | OUTPATIENT
Start: 2019-01-08 | End: 2019-12-31

## 2019-01-08 ASSESSMENT — ANXIETY QUESTIONNAIRES
6. BECOMING EASILY ANNOYED OR IRRITABLE: NOT AT ALL
IF YOU CHECKED OFF ANY PROBLEMS ON THIS QUESTIONNAIRE, HOW DIFFICULT HAVE THESE PROBLEMS MADE IT FOR YOU TO DO YOUR WORK, TAKE CARE OF THINGS AT HOME, OR GET ALONG WITH OTHER PEOPLE: SOMEWHAT DIFFICULT
7. FEELING AFRAID AS IF SOMETHING AWFUL MIGHT HAPPEN: SEVERAL DAYS
5. BEING SO RESTLESS THAT IT IS HARD TO SIT STILL: SEVERAL DAYS
GAD7 TOTAL SCORE: 7
2. NOT BEING ABLE TO STOP OR CONTROL WORRYING: SEVERAL DAYS
3. WORRYING TOO MUCH ABOUT DIFFERENT THINGS: SEVERAL DAYS
1. FEELING NERVOUS, ANXIOUS, OR ON EDGE: SEVERAL DAYS

## 2019-01-08 ASSESSMENT — PATIENT HEALTH QUESTIONNAIRE - PHQ9
SUM OF ALL RESPONSES TO PHQ QUESTIONS 1-9: 4
5. POOR APPETITE OR OVEREATING: MORE THAN HALF THE DAYS

## 2019-01-08 ASSESSMENT — MIFFLIN-ST. JEOR: SCORE: 1599.25

## 2019-01-08 NOTE — PATIENT INSTRUCTIONS
Employee Assistance Program ( EAP) may be a good option to help deal with increased stressors.  This is a resource that is provided by your employer but remains independent. They are not provided with any information as to who is using the service.  It often allows you to be seen on an earlier basis.  The other option is accessing your mental health benefits through your insurance.  EAP can often bridge this gap.  EAP is a covered benefit from your employer which is available for employee and family members.      BLOOD PRESSURE well controlled. Interested in cutting back further;  OK to cut in half or stop Losartan and monitor bp at  Pharmacy

## 2019-01-08 NOTE — PROGRESS NOTES
Chief Complaint   Patient presents with     Hypertension     Stress     has been noting some increased stress and is more emotional        SUBJECTIVE:   Ne Chavis is a 53 year old female who presents to clinic today for the following health issues:      Hypertension Follow-up      Outpatient blood pressures are being checked at home.  Results are 110-120/60-70.    Low Salt Diet: no added salt      Amount of exercise or physical activity: None    Problems taking medications regularly: No    Medication side effects: none    Diet: no dairy and is on a special diet at this time         Abnormal Mood Symptoms  Grief and Loss issues; mother 7 years, dog 3 yrs  Work stressors; same employer past 22 year  More anxious and overwhelmed.       Duration: for about the past 4 months    Description:  Depression: YES  Anxiety: YES  Panic attacks: no     Accompanying signs and symptoms: see PHQ-9 and YECENIA scores    History (similar episodes/previous evaluation): None    Precipitating or alleviating factors: None    Therapies tried and outcome: none      Problem list and histories reviewed & adjusted, as indicated.  Additional history: as documented    Patient Active Problem List   Diagnosis     Other and unspecified anterior pituitary hyperfunction     Sarcoidosis     Menorrhagia     Family history of colon cancer     HYPERLIPIDEMIA LDL GOAL <160     Benign neoplasm of colon     Sleep concern     Family history of malignant neoplasm of thyroid gland     Morbid obesity, unspecified obesity type (H)     Benign essential hypertension     Urinary, incontinence, stress female     Pelvic floor dysfunction     Lesion of bladder     Past Surgical History:   Procedure Laterality Date     ARTHROSCOPY KNEE RT/LT  4/30/2009    Dr. Hodges, Right     C RAD RESEC TONSIL/PILLARS  ~2005     CYSTOSCOPY       LAPAROSCOPIC CHOLECYSTECTOMY  9/10/2008    Dr. Aguayo     THORACOSCOPIC BIOPSY RIB  2004    Dr. Johnson       Social History     Tobacco  Use     Smoking status: Never Smoker     Smokeless tobacco: Never Used   Substance Use Topics     Alcohol use: Yes     Comment: two per year     Family History   Problem Relation Age of Onset     Hypertension Mother      Arthritis Mother         osteoarthritis     Cerebrovascular Disease Mother      Cancer - colorectal Father          at age 64; Dx @61     Diabetes Maternal Grandmother      Thyroid Disease Sister      Hypertension Sister      Hypertension Sister      Hypertension Sister      Hypertension Sister      Hypertension Sister          Current Outpatient Medications   Medication Sig Dispense Refill     Calcium Carbonate-Vitamin D (CALCIUM + D) 600-200 MG-UNIT per tablet Take 2 tablets by mouth 2 times daily.       Glucosamine-Chondroit-Vit C-Mn (GLUCOSAMINE CHONDR 1500 COMPLX) CAPS Take 2 capsules by mouth 2 times daily.       loratadine (CLARITIN) 10 MG tablet Take 10 mg by mouth daily       losartan (COZAAR) 25 MG tablet Take 1 tablet (25 mg) by mouth daily Profile Rx: patient will contact pharmacy when needed 90 tablet 1     magnesium 100 MG CAPS        MULTI-DAY VITAMINS OR None Entered       OMEGA 3 1000 MG OR CAPS daily       Allergies   Allergen Reactions     No Known Drug Allergies      BP Readings from Last 3 Encounters:   19 118/72   18 124/80   18 122/79    Wt Readings from Last 3 Encounters:   19 96.7 kg (213 lb 1.6 oz)   18 99.3 kg (219 lb)   18 119.7 kg (264 lb)                  Labs reviewed in EPIC    Reviewed and updated as needed this visit by clinical staff  Tobacco  Allergies  Meds  Med Hx  Surg Hx  Fam Hx  Soc Hx      Reviewed and updated as needed this visit by Provider  Tobacco  Allergies  Meds  Problems  Med Hx  Surg Hx  Fam Hx         ROS:  CONSTITUTIONAL: NEGATIVE for fever, chills, change in weight  RESP: NEGATIVE for significant cough or SOB  CV: NEGATIVE for chest pain, palpitations or peripheral edema and reviewed blood  "pressure  GI: NEGATIVE for nausea, abdominal pain, heartburn, or change in bowel habits  : preserved renal function  NEURO: denies headache or vision changes;   ENDOCRINE: NEGATIVE for temperature intolerance, skin/hair changes  PSYCHIATRIC: work and family stressors; anticipating changes.    OBJECTIVE:     /72   Pulse 71   Temp 98.5  F (36.9  C) (Oral)   Resp 16   Ht 1.702 m (5' 7\")   Wt 96.7 kg (213 lb 1.6 oz)   SpO2 96%   BMI 33.38 kg/m    Body mass index is 33.38 kg/m .  GENERAL: healthy, alert and no distress  NECK: no adenopathy, no asymmetry, masses, or scars and thyroid normal to palpation  RESP: lungs clear to auscultation - no rales, rhonchi or wheezes  CV: regular rates and rhythm, normal S1 S2, no S3 or S4, peripheral pulses strong and no peripheral edema  ABDOMEN: soft, nontender, no hepatosplenomegaly, no masses and bowel sounds normal  MS: no gross musculoskeletal defects noted, no edema  SKIN: no suspicious lesions or rashes  NEURO: Normal strength and tone, mentation intact and speech normal  PSYCH: mentation appears normal and affect normal/bright- appropriate; frustration expressed.    ASSESSMENT/PLAN:     (E66.01) Morbid obesity, unspecified obesity type (H)  (primary encounter diagnosis)  Comment: Body mass index is 33.38 kg/m .   Plan: healthy eating, regular exercise    (I10) Benign essential hypertension  Comment: BP  changed to ARB and well tolerated; home cuff correlates with office reading.  Plan: losartan (COZAAR) 25 MG tablet          Stress reaction  Work and family stressors discussed.  Encourage look into EAP treatment options.     Dorene Waldron MD  Internal Medicine   [facility]  "

## 2019-01-09 ASSESSMENT — ANXIETY QUESTIONNAIRES: GAD7 TOTAL SCORE: 7

## 2019-01-30 ENCOUNTER — MYC MEDICAL ADVICE (OUTPATIENT)
Dept: FAMILY MEDICINE | Facility: CLINIC | Age: 54
End: 2019-01-30

## 2019-04-17 NOTE — PROGRESS NOTES
Discharge Note    Progress reporting period is from initial eval to Oct 18, 2018.     Ne failed to return for next follow up visit and current status is unknown.  Please see information below for last relevant information on current status.  Patient seen for Rxs Used: 1 visits.    SUBJECTIVE  Subjective changes noted by patient:  Subjective: see eval.  Current pain level is Current Pain level: 0/10.     Previous pain level was  Initial Pain level: 0/10.   Changes in function:  Yes (See Goal flowsheet attached for changes in current functional level)  Adverse reaction to treatment or activity: None    OBJECTIVE  Changes noted in objective findings: Objective: see eval.    ASSESSMENT/PLAN  Diagnosis: mixed incont/ weakness   DIAGP:  The primary encounter diagnosis was Stress incontinence. A diagnosis of Pelvic floor dysfunction was also pertinent to this visit.  Updated problem list and treatment plan:     Decreased function - HEP  Impaired muscle performance - HEP    STG/LTGs have been met or progress has been made towards goals:  Yes, please see goal flowsheet for most current information.    Assessment of Progress: current status is unknown.  Last current status:  .  Self Management Plans:  HEP    I have re-evaluated this patient and find that the nature, scope, duration and intensity of the therapy is appropriate for the medical condition of the patient.  Ne continues to require the following intervention to meet STG and LTG's:  HEP.    Recommendations:  Discharge with current home program.  Patient to follow up with MD as needed. Episode to be closed at this time and patient formally discharged from therapy.    Tequila Mcgee, PT      Please refer to the daily flowsheet for treatment today, total treatment time and time spent performing 1:1 timed codes.

## 2019-05-31 ENCOUNTER — MYC MEDICAL ADVICE (OUTPATIENT)
Dept: FAMILY MEDICINE | Facility: CLINIC | Age: 54
End: 2019-05-31

## 2019-06-20 ENCOUNTER — HOSPITAL ENCOUNTER (OUTPATIENT)
Dept: MAMMOGRAPHY | Facility: CLINIC | Age: 54
Discharge: HOME | End: 2019-06-20
Attending: INTERNAL MEDICINE | Admitting: INTERNAL MEDICINE
Payer: COMMERCIAL

## 2019-06-20 DIAGNOSIS — Z12.31 VISIT FOR SCREENING MAMMOGRAM: ICD-10-CM

## 2019-06-20 PROCEDURE — 77067 SCR MAMMO BI INCL CAD: CPT

## 2019-08-20 ENCOUNTER — TRANSFERRED RECORDS (OUTPATIENT)
Dept: HEALTH INFORMATION MANAGEMENT | Facility: CLINIC | Age: 54
End: 2019-08-20

## 2019-09-30 ENCOUNTER — HEALTH MAINTENANCE LETTER (OUTPATIENT)
Age: 54
End: 2019-09-30

## 2019-12-26 NOTE — PROGRESS NOTES
Chief Complaint   Patient presents with     Physical     pt fasting         SUBJECTIVE:   CC: Ne Chavis is an 54 year old woman who presents for preventive health visit.     Healthy Habits:     Getting at least 3 servings of Calcium per day:  Yes    Bi-annual eye exam:  Yes    Dental care twice a year:  Yes    Sleep apnea or symptoms of sleep apnea:  Sleep apnea    Diet:  Regular (no restrictions)    Frequency of exercise:  1 day/week    Duration of exercise:  15-30 minutes    Taking medications regularly:  Yes    Medication side effects:  Not applicable    PHQ-2 Total Score: 2    Additional concerns today:  No      Today's PHQ-2 Score:   PHQ-2 ( 1999 [employer]) 12/31/2019   Q1: Little interest or pleasure in doing things 1   Q2: Feeling down, depressed or hopeless 1   PHQ-2 Score 2   Q1: Little interest or pleasure in doing things Several days   Q2: Feeling down, depressed or hopeless Several days   PHQ-2 Score 2       Abuse: Current or Past(Physical, Sexual or Emotional)- No  Do you feel safe in your environment? Yes    Have you ever done Advance Care Planning? (For example, a Health Directive, POLST, or a discussion with a medical provider or your loved ones about your wishes): Yes, patient states has an Advance Care Planning document and will bring a copy to the clinic.    Social History     Tobacco Use     Smoking status: Never Smoker     Smokeless tobacco: Never Used   Substance Use Topics     Alcohol use: Yes     Comment: two per year     If you drink alcohol do you typically have >3 drinks per day or >7 drinks per week? No    Alcohol Use 12/31/2019   Prescreen: >3 drinks/day or >7 drinks/week? Not Applicable   Prescreen: >3 drinks/day or >7 drinks/week? -       Reviewed orders with patient.  Reviewed health maintenance and updated orders accordingly - Yes  Labs reviewed in EPIC  BP Readings from Last 3 Encounters:   12/31/19 128/74   01/08/19 118/72   12/12/18 124/80    Wt Readings from Last 3 Encounters:    19 97.7 kg (215 lb 4.8 oz)   19 96.7 kg (213 lb 1.6 oz)   18 99.3 kg (219 lb)                  Patient Active Problem List   Diagnosis     Other and unspecified anterior pituitary hyperfunction     Sarcoidosis     Menorrhagia     Family history of colon cancer     HYPERLIPIDEMIA LDL GOAL <160     Benign neoplasm of colon     Sleep concern     Family history of malignant neoplasm of thyroid gland     Morbid obesity, unspecified obesity type (H)     Benign essential hypertension     Urinary, incontinence, stress female     Pelvic floor dysfunction     Lesion of bladder     Past Surgical History:   Procedure Laterality Date     ARTHROSCOPY KNEE RT/LT  2009    Dr. Hodges, Right     C RAD RESEC TONSIL/PILLARS  ~     CYSTOSCOPY       LAPAROSCOPIC CHOLECYSTECTOMY  9/10/2008    Dr. Aguayo     THORACOSCOPIC BIOPSY RIB      Dr. Johnson       Social History     Tobacco Use     Smoking status: Never Smoker     Smokeless tobacco: Never Used   Substance Use Topics     Alcohol use: Yes     Comment: two per year     Family History   Problem Relation Age of Onset     Hypertension Mother      Arthritis Mother         osteoarthritis     Cerebrovascular Disease Mother      Cancer - colorectal Father          at age 64; Dx @61     Diabetes Maternal Grandmother      Thyroid Disease Sister      Hypertension Sister      Hypertension Sister      Hypertension Sister      Hypertension Sister      Hypertension Sister          Current Outpatient Medications   Medication Sig Dispense Refill     Calcium Carbonate-Vitamin D (CALCIUM + D) 600-200 MG-UNIT per tablet Take 2 tablets by mouth 2 times daily.       loratadine (CLARITIN) 10 MG tablet Take 10 mg by mouth daily       magnesium 100 MG CAPS        MULTI-DAY VITAMINS OR None Entered       OMEGA 3 1000 MG OR CAPS daily       Allergies   Allergen Reactions     No Known Drug Allergies      Recent Labs   Lab Test 18  0948 17  1456 16  0914  05/26/15  0919   LDL  --  129* 134* 129   HDL  --  64 50 59   TRIG  --  146 152* 132   ALT 42 85* 58* 37   CR  --  0.94 0.88 0.95   GFRESTIMATED  --  62 67 62   GFRESTBLACK  --  75 81 75   POTASSIUM  --  4.2 4.3 4.1   TSH  --  2.10 1.40 1.61        Mammogram Screening: Patient over age 50, mutual decision to screen reflected in health maintenance.    Pertinent mammograms are reviewed under the imaging tab.  History of abnormal Pap smear: NO - age 30- 65 PAP every 3 years recommended  PAP / HPV Latest Ref Rng & Units 6/27/2018 5/26/2015 1/3/2012   PAP - NIL NIL NIL   HPV 16 DNA NEG:Negative Negative - -   HPV 18 DNA NEG:Negative Negative - -   OTHER HR HPV NEG:Negative Negative - -     Reviewed and updated as needed this visit by clinical staff  Tobacco  Allergies  Meds  Med Hx  Surg Hx  Fam Hx  Soc Hx        Reviewed and updated as needed this visit by Provider  Tobacco  Allergies  Meds  Problems  Med Hx  Surg Hx  Fam Hx        Past Medical History:   Diagnosis Date     Herpes zoster without mention of complication      Other and unspecified anterior pituitary hyperfunction 1990    saw endo; MRI; Elevated Prolactin level     Other and unspecified hyperlipidemia      Sarcoidosis 2004    BX per Pulmonary; Prednisone for a bit     Unspecified hemorrhoids without mention of complication       Past Surgical History:   Procedure Laterality Date     ARTHROSCOPY KNEE RT/LT  4/30/2009    Dr. Hodges, Right     C RAD RESEC TONSIL/PILLARS  ~2005     CYSTOSCOPY       LAPAROSCOPIC CHOLECYSTECTOMY  9/10/2008    Dr. Aguayo     THORACOSCOPIC BIOPSY RIB  2004    Dr. Johnson       Review of Systems   Constitutional: Negative for chills and fever.   HENT: Negative for congestion and ear pain.    Eyes: Negative for pain.   Respiratory: Negative for cough.    Cardiovascular: Negative for chest pain.   Gastrointestinal: Negative for abdominal pain, constipation, diarrhea and hematochezia.   Genitourinary: Negative for  "frequency and hematuria.   Neurological: Negative for dizziness.   Psychiatric/Behavioral: The patient is nervous/anxious.           OBJECTIVE:   /74   Pulse 69   Temp 98.3  F (36.8  C) (Oral)   Resp 15   Ht 1.702 m (5' 7\")   Wt 97.7 kg (215 lb 4.8 oz)   LMP 12/24/2019 (Approximate)   SpO2 96%   BMI 33.72 kg/m    Physical Exam  GENERAL: healthy, alert and no distress  EYES: Eyes grossly normal to inspection  HENT: ear canals and TM's normal, nose and mouth without ulcers or lesions  NECK: no adenopathy, no asymmetry, masses, or scars and thyroid normal to palpation  RESP: lungs clear to auscultation - no rales, rhonchi or wheezes  BREAST: normal without masses, tenderness or nipple discharge and no palpable axillary masses or adenopathy  CV: regular rates and rhythm, normal S1 S2, no S3 or S4, no murmur, click or rub, peripheral pulses strong and no peripheral edema  ABDOMEN: soft, nontender, no hepatosplenomegaly, no masses and bowel sounds normal  MS: no gross musculoskeletal defects noted, no edema  NEURO: Normal strength and tone, sensory exam grossly normal, mentation intact, gait normal including heel/toe/tandem walking and Romberg normal  PSYCH: mentation appears normal, affect normal/bright    Diagnostic Test Results:  Labs reviewed in Epic    ASSESSMENT/PLAN:   (Z00.00) Routine general medical examination at a health care facility  (primary encounter diagnosis)  Comment: HEALTH CARE MAINTENANCE  Plan: reviewed labs, immunizations- consider Shing-Irina    (Z13.6) CARDIOVASCULAR SCREENING; LDL GOAL LESS THAN 130  Comment:   Plan: Lipid panel reflex to direct LDL Fasting,         Glucose          COUNSELING:  Reviewed preventive health counseling, as reflected in patient instructions       Regular exercise       Healthy diet/nutrition    Estimated body mass index is 33.72 kg/m  as calculated from the following:    Height as of this encounter: 1.702 m (5' 7\").    Weight as of this encounter: 97.7 kg " (215 lb 4.8 oz).    Weight management plan: Discussed healthy diet and exercise guidelines     reports that she has never smoked. She has never used smokeless tobacco.      Counseling Resources:  ATP IV Guidelines  Pooled Cohorts Equation Calculator  Breast Cancer Risk Calculator  FRAX Risk Assessment  ICSI Preventive Guidelines  Dietary Guidelines for Americans, 2010  USDA's MyPlate  ASA Prophylaxis  Lung CA Screening    Dorene Waldron MD  Internal Medicine   [facility]

## 2019-12-31 ENCOUNTER — OFFICE VISIT (OUTPATIENT)
Dept: FAMILY MEDICINE | Facility: CLINIC | Age: 54
End: 2019-12-31
Payer: MEDICAID

## 2019-12-31 VITALS
OXYGEN SATURATION: 96 % | BODY MASS INDEX: 33.79 KG/M2 | RESPIRATION RATE: 15 BRPM | SYSTOLIC BLOOD PRESSURE: 128 MMHG | WEIGHT: 215.3 LBS | TEMPERATURE: 98.3 F | DIASTOLIC BLOOD PRESSURE: 74 MMHG | HEIGHT: 67 IN | HEART RATE: 69 BPM

## 2019-12-31 DIAGNOSIS — Z13.6 CARDIOVASCULAR SCREENING; LDL GOAL LESS THAN 130: ICD-10-CM

## 2019-12-31 DIAGNOSIS — Z00.00 ROUTINE GENERAL MEDICAL EXAMINATION AT A HEALTH CARE FACILITY: Primary | ICD-10-CM

## 2019-12-31 PROCEDURE — 82947 ASSAY GLUCOSE BLOOD QUANT: CPT | Performed by: INTERNAL MEDICINE

## 2019-12-31 PROCEDURE — 36415 COLL VENOUS BLD VENIPUNCTURE: CPT | Performed by: INTERNAL MEDICINE

## 2019-12-31 PROCEDURE — 80061 LIPID PANEL: CPT | Performed by: INTERNAL MEDICINE

## 2019-12-31 PROCEDURE — 99396 PREV VISIT EST AGE 40-64: CPT | Performed by: INTERNAL MEDICINE

## 2019-12-31 ASSESSMENT — ENCOUNTER SYMPTOMS
COUGH: 0
FREQUENCY: 0
EYE PAIN: 0
FEVER: 0
ABDOMINAL PAIN: 0
DIZZINESS: 0
HEMATURIA: 0
CHILLS: 0
DIARRHEA: 0
CONSTIPATION: 0
HEMATOCHEZIA: 0
NERVOUS/ANXIOUS: 1

## 2019-12-31 ASSESSMENT — MIFFLIN-ST. JEOR: SCORE: 1604.22

## 2020-01-02 LAB
CHOLEST SERPL-MCNC: 222 MG/DL
GLUCOSE SERPL-MCNC: 86 MG/DL (ref 70–99)
HDLC SERPL-MCNC: 81 MG/DL
LDLC SERPL CALC-MCNC: 126 MG/DL
NONHDLC SERPL-MCNC: 141 MG/DL
TRIGL SERPL-MCNC: 75 MG/DL

## 2020-01-10 ENCOUNTER — TRANSFERRED RECORDS (OUTPATIENT)
Dept: HEALTH INFORMATION MANAGEMENT | Facility: CLINIC | Age: 55
End: 2020-01-10

## 2020-07-06 ENCOUNTER — ANCILLARY PROCEDURE (OUTPATIENT)
Dept: MAMMOGRAPHY | Facility: CLINIC | Age: 55
End: 2020-07-06
Attending: INTERNAL MEDICINE
Payer: COMMERCIAL

## 2020-07-06 DIAGNOSIS — Z12.31 VISIT FOR SCREENING MAMMOGRAM: ICD-10-CM

## 2020-07-06 PROCEDURE — 77067 SCR MAMMO BI INCL CAD: CPT | Mod: TC

## 2020-07-20 ENCOUNTER — TELEPHONE (OUTPATIENT)
Dept: FAMILY MEDICINE | Facility: CLINIC | Age: 55
End: 2020-07-20

## 2020-07-20 NOTE — TELEPHONE ENCOUNTER
Reason for call:  Form   Our goal is to have forms completed within 72 hours, however some forms may require a visit or additional information.     Who is the form from? Patient  Where did the form come from? Patient or family brought in     What clinic location was the form placed at? [place]  Where was the form placed? Dr Waldron Box/Folder  What number is listed as a contact on the form? [phone]    Phone call message - patient request for a letter, form or note:     Date needed: as soon as possible  Please mail to Envelope is addressed  Has the patient signed a consent form for release of information? Not Applicable    Additional comments:     Type of letter, form or note: medical    Phone number to reach patient:  Cell number on file:    Telephone Information:   Mobile [phone]       Best Time:  any    Can we leave a detailed message on this number?  Not Applicable    Travel screening: Not Applicable

## 2020-09-23 ENCOUNTER — MYC MEDICAL ADVICE (OUTPATIENT)
Dept: FAMILY MEDICINE | Facility: CLINIC | Age: 55
End: 2020-09-23

## 2020-10-20 ENCOUNTER — VIRTUAL VISIT (OUTPATIENT)
Dept: FAMILY MEDICINE | Facility: CLINIC | Age: 55
End: 2020-10-20
Payer: COMMERCIAL

## 2020-10-20 DIAGNOSIS — G47.30 SLEEP APNEA, UNSPECIFIED TYPE: ICD-10-CM

## 2020-10-20 DIAGNOSIS — N95.1 PERIMENOPAUSAL: ICD-10-CM

## 2020-10-20 DIAGNOSIS — G47.9 SLEEP DISORDER: Primary | ICD-10-CM

## 2020-10-20 PROCEDURE — 99213 OFFICE O/P EST LOW 20 MIN: CPT | Mod: 95 | Performed by: INTERNAL MEDICINE

## 2020-10-20 RX ORDER — TRAZODONE HYDROCHLORIDE 50 MG/1
25-50 TABLET, FILM COATED ORAL AT BEDTIME
Qty: 90 TABLET | Refills: 1 | Status: SHIPPED | OUTPATIENT
Start: 2020-10-20 | End: 2021-02-05 | Stop reason: ALTCHOICE

## 2020-10-20 NOTE — PROGRESS NOTES
"Ne Chavis is a 55 year old female who is being evaluated via a billable video visit.      The patient has been notified of following:     \"This video visit will be conducted via a call between you and your physician/provider. We have found that certain health care needs can be provided without the need for an in-person physical exam.  This service lets us provide the care you need with a video conversation.  If a prescription is necessary we can send it directly to your pharmacy.  If lab work is needed we can place an order for that and you can then stop by our lab to have the test done at a later time.    Video visits are billed at different rates depending on your insurance coverage.  Please reach out to your insurance provider with any questions.    If during the course of the call the physician/provider feels a video visit is not appropriate, you will not be charged for this service.\"    Patient has given verbal consent for Video visit? Yes  How would you like to obtain your AVS? MyChart  If you are dropped from the video visit, the video invite should be resent to: Text to cell phone: [phone]  Will anyone else be joining your video visit? No      Subjective     Ne Chavis is a 55 year old female who presents today via video visit for the following health issues:    HPI     Patient presenting to virtual visit due to hormonal concerns.   Difficulty sleeping; hard to get to sleep and then   Mini hot flashes  Warm feeling, not exactly hot flashes.   Mind hard to shut down;     Perimenopause;   Menses irregular.   Menses can go several months; has not gone full year.     During day very brief sxs    Tried:   Sleepy time tea  Melatonin  Tried Trazodone in 2016; does not recall issues with it; she then added CPAP    EDELMIRA- using CPAP nightly    Chiropractor discussed \"Citizen of [place] Test\" salivary and urine evaluation; \"[email or web address]\"      Review of Systems   CONSTITUTIONAL: NEGATIVE for fever, chills, change in " "weight  ENT/MOUTH: NEGATIVE for ear, mouth and throat problems  RESP:EDELMIRA using CPAP  CV: NEGATIVE for chest pain, palpitations or peripheral edema  : menopausal female  MUSCULOSKELETAL: NEGATIVE for significant arthralgias or myalgia  NEURO: NEGATIVE for weakness, dizziness or paresthesias  ENDOCRINE: menopausal female  PSYCHIATRIC: sleep challenges discussed      Objective           Vitals:  No vitals were obtained today due to virtual visit.    Physical Exam     GENERAL: Healthy, alert and no distress  EYES: Eyes grossly normal to inspection.  No discharge or erythema, or obvious scleral/conjunctival abnormalities.  RESP: No audible wheeze, cough, or visible cyanosis.  No visible retractions or increased work of breathing.    SKIN: Visible skin clear. No significant rash, abnormal pigmentation or lesions.  NEURO: Cranial nerves grossly intact.  Mentation and speech appropriate for age.  PSYCH: Mentation appears normal, affect normal/bright, judgement and insight intact, normal speech and appearance well-groomed.            Assessment & Plan     (G47.9) Sleep disorder  (primary encounter diagnosis)  Comment: trouble getting to sleep; hard to turn mind off; she is wondering if hormone related; trial of Trazodone in 2016 then added CPAP.  Perimenopausal; med was effective.   Plan: traZODone (DESYREL) 50 MG tablet          (G47.30) Sleep apnea, unspecified type  Comment: she has used CPAP since Spring 2017  Plan: continue     Perimenopausal Female     BMI:   Estimated body mass index is 33.72 kg/m  as calculated from the following:    Height as of 12/31/19: 1.702 m (5' 7\").    Weight as of 12/31/19: 97.7 kg (215 lb 4.8 oz).   Weight management plan: Discussed healthy diet and exercise guidelines         Return in about 2 months (around 12/31/2020) for Physical Exam.    Dorene Waldron MD  Internal Medicine   [facility]      Video-Visit Details    Type of service:  Video Visit    Video " Start Time: 1:10 PM    Video End Time:1:40 PM    Originating Location (pt. Location): Home    Distant Location (provider location):  [facility]     Platform used for Video Visit: [employer]

## 2020-10-28 ENCOUNTER — ALLIED HEALTH/NURSE VISIT (OUTPATIENT)
Dept: NURSING | Facility: CLINIC | Age: 55
End: 2020-10-28
Payer: COMMERCIAL

## 2020-10-28 DIAGNOSIS — Z23 NEED FOR PROPHYLACTIC VACCINATION AND INOCULATION AGAINST INFLUENZA: Primary | ICD-10-CM

## 2020-10-28 PROCEDURE — 99207 PR NO CHARGE NURSE ONLY: CPT

## 2020-10-28 PROCEDURE — 90471 IMMUNIZATION ADMIN: CPT

## 2020-10-28 PROCEDURE — 90682 RIV4 VACC RECOMBINANT DNA IM: CPT

## 2021-01-04 DIAGNOSIS — E78.5 HYPERLIPIDEMIA LDL GOAL <160: Primary | ICD-10-CM

## 2021-01-05 ENCOUNTER — OFFICE VISIT (OUTPATIENT)
Dept: FAMILY MEDICINE | Facility: CLINIC | Age: 56
End: 2021-01-05
Payer: COMMERCIAL

## 2021-01-05 VITALS
WEIGHT: 230.3 LBS | TEMPERATURE: 98.6 F | HEIGHT: 67 IN | DIASTOLIC BLOOD PRESSURE: 72 MMHG | SYSTOLIC BLOOD PRESSURE: 122 MMHG | OXYGEN SATURATION: 96 % | RESPIRATION RATE: 15 BRPM | BODY MASS INDEX: 36.15 KG/M2 | HEART RATE: 77 BPM

## 2021-01-05 DIAGNOSIS — E78.5 HYPERLIPIDEMIA LDL GOAL <160: ICD-10-CM

## 2021-01-05 DIAGNOSIS — G47.9 SLEEP DISTURBANCE: ICD-10-CM

## 2021-01-05 DIAGNOSIS — G47.30 SLEEP APNEA, UNSPECIFIED TYPE: ICD-10-CM

## 2021-01-05 DIAGNOSIS — E66.01 MORBID OBESITY, UNSPECIFIED OBESITY TYPE (H): ICD-10-CM

## 2021-01-05 DIAGNOSIS — Z00.00 ROUTINE GENERAL MEDICAL EXAMINATION AT A HEALTH CARE FACILITY: Primary | ICD-10-CM

## 2021-01-05 LAB
ALBUMIN SERPL-MCNC: 4 G/DL (ref 3.4–5)
ALP SERPL-CCNC: 78 U/L (ref 40–150)
ALT SERPL W P-5'-P-CCNC: 32 U/L (ref 0–50)
ANION GAP SERPL CALCULATED.3IONS-SCNC: 3 MMOL/L (ref 3–14)
AST SERPL W P-5'-P-CCNC: 20 U/L (ref 0–45)
BILIRUB SERPL-MCNC: 0.6 MG/DL (ref 0.2–1.3)
BUN SERPL-MCNC: 23 MG/DL (ref 7–30)
CALCIUM SERPL-MCNC: 8.7 MG/DL (ref 8.5–10.1)
CHLORIDE SERPL-SCNC: 105 MMOL/L (ref 94–109)
CHOLEST SERPL-MCNC: 234 MG/DL
CO2 SERPL-SCNC: 31 MMOL/L (ref 20–32)
CREAT SERPL-MCNC: 0.87 MG/DL (ref 0.52–1.04)
GFR SERPL CREATININE-BSD FRML MDRD: 74 ML/MIN/{1.73_M2}
GLUCOSE SERPL-MCNC: 88 MG/DL (ref 70–99)
HDLC SERPL-MCNC: 76 MG/DL
LDLC SERPL CALC-MCNC: 144 MG/DL
NONHDLC SERPL-MCNC: 158 MG/DL
POTASSIUM SERPL-SCNC: 4.2 MMOL/L (ref 3.4–5.3)
PROT SERPL-MCNC: 7.8 G/DL (ref 6.8–8.8)
SODIUM SERPL-SCNC: 139 MMOL/L (ref 133–144)
TRIGL SERPL-MCNC: 70 MG/DL

## 2021-01-05 PROCEDURE — 36415 COLL VENOUS BLD VENIPUNCTURE: CPT | Performed by: INTERNAL MEDICINE

## 2021-01-05 PROCEDURE — 99212 OFFICE O/P EST SF 10 MIN: CPT | Mod: 25 | Performed by: INTERNAL MEDICINE

## 2021-01-05 PROCEDURE — 80061 LIPID PANEL: CPT | Performed by: INTERNAL MEDICINE

## 2021-01-05 PROCEDURE — 80053 COMPREHEN METABOLIC PANEL: CPT | Performed by: INTERNAL MEDICINE

## 2021-01-05 PROCEDURE — 99396 PREV VISIT EST AGE 40-64: CPT | Performed by: INTERNAL MEDICINE

## 2021-01-05 ASSESSMENT — ENCOUNTER SYMPTOMS
SHORTNESS OF BREATH: 0
DYSURIA: 0
NERVOUS/ANXIOUS: 0
FREQUENCY: 0
COUGH: 0
EYE PAIN: 0
HEMATURIA: 0
PARESTHESIAS: 0
DIARRHEA: 0
HEARTBURN: 0
JOINT SWELLING: 0
ARTHRALGIAS: 1
PALPITATIONS: 0
WEAKNESS: 0
HEADACHES: 1
DIZZINESS: 0
NAUSEA: 0
BREAST MASS: 0
APNEA: 1
CONSTIPATION: 0
FEVER: 0
SORE THROAT: 0
MYALGIAS: 1
CHILLS: 0
HEMATOCHEZIA: 0
ABDOMINAL PAIN: 0

## 2021-01-05 ASSESSMENT — MIFFLIN-ST. JEOR: SCORE: 1668.84

## 2021-01-05 NOTE — PROGRESS NOTES
Chief Complaint   Patient presents with     Physical     declined pap until June.          SUBJECTIVE:   CC: Ne Chavis is an 55 year old woman who presents for preventive health visit.       Patient has been advised of split billing requirements and indicates understanding: Yes  Healthy Habits:     Getting at least 3 servings of Calcium per day:  Yes    Bi-annual eye exam:  Yes    Dental care twice a year:  Yes    Sleep apnea or symptoms of sleep apnea:  Sleep apnea    Diet:  Regular (no restrictions)    Frequency of exercise:  1 day/week    Duration of exercise:  15-30 minutes    Taking medications regularly:  Yes    Barriers to taking medications:  None    Medication side effects:  None    PHQ-2 Total Score: 1    Additional concerns today:  Yes            Today's PHQ-2 Score:   PHQ-2 ( 1999 Pfizer) 1/5/2021   Q1: Little interest or pleasure in doing things 0   Q2: Feeling down, depressed or hopeless 1   PHQ-2 Score 1   Q1: Little interest or pleasure in doing things Not at all   Q2: Feeling down, depressed or hopeless Several days   PHQ-2 Score 1       Abuse: Current or Past (Physical, Sexual or Emotional) - No  Do you feel safe in your environment? Yes        Social History     Tobacco Use     Smoking status: Never Smoker     Smokeless tobacco: Never Used   Substance Use Topics     Alcohol use: Yes     Comment: very little         Alcohol Use 1/5/2021   Prescreen: >3 drinks/day or >7 drinks/week? No   Prescreen: >3 drinks/day or >7 drinks/week? -       Reviewed orders with patient.  Reviewed health maintenance and updated orders accordingly - Yes  Labs reviewed in EPIC  BP Readings from Last 3 Encounters:   01/05/21 122/72   12/31/19 128/74   01/08/19 118/72    Wt Readings from Last 3 Encounters:   01/05/21 104.5 kg (230 lb 4.8 oz)   12/31/19 97.7 kg (215 lb 4.8 oz)   01/08/19 96.7 kg (213 lb 1.6 oz)                  Patient Active Problem List   Diagnosis     Other and unspecified anterior pituitary  hyperfunction     Sarcoidosis     Menorrhagia     Family history of colon cancer     HYPERLIPIDEMIA LDL GOAL <160     Benign neoplasm of colon     Sleep concern     Family history of malignant neoplasm of thyroid gland     Morbid obesity, unspecified obesity type (H)     Benign essential hypertension     Urinary, incontinence, stress female     Pelvic floor dysfunction     Lesion of bladder     Past Surgical History:   Procedure Laterality Date     ARTHROSCOPY KNEE RT/LT  2009    Dr. Hodges, Right     C RAD RESEC TONSIL/PILLARS  ~2005     COLONOSCOPY  2019     CYSTOSCOPY       LAPAROSCOPIC CHOLECYSTECTOMY  9/10/2008    Dr. Aguayo     THORACOSCOPIC BIOPSY RIB      Dr. Johnson       Social History     Tobacco Use     Smoking status: Never Smoker     Smokeless tobacco: Never Used   Substance Use Topics     Alcohol use: Yes     Comment: very little     Family History   Problem Relation Age of Onset     Hypertension Mother      Arthritis Mother         osteoarthritis     Cerebrovascular Disease Mother      Osteoporosis Mother      Cancer - colorectal Father          at age 64; Dx @61     Colon Cancer Father      Diabetes Maternal Grandmother      Thyroid Disease Sister      Hypertension Sister      Hypertension Sister      Hypertension Sister      Hypertension Sister      Hypertension Sister      Diabetes Sister      Obesity Sister      Hypertension Sister          Current Outpatient Medications   Medication Sig Dispense Refill     Calcium Carbonate-Vitamin D (CALCIUM + D) 600-200 MG-UNIT per tablet Take 2 tablets by mouth 2 times daily.       loratadine (CLARITIN) 10 MG tablet Take 10 mg by mouth daily as needed        magnesium 100 MG CAPS        MULTI-DAY VITAMINS OR None Entered       OMEGA 3 1000 MG OR CAPS daily       traZODone (DESYREL) 50 MG tablet Take 0.5-1 tablets (25-50 mg) by mouth At Bedtime 90 tablet 1     Allergies   Allergen Reactions     No Known Drug Allergies      Recent Labs   Lab  Test 12/31/19  1138 06/27/18  0948 11/20/17  1456 08/31/16  0914   *  --  129* 134*   HDL 81  --  64 50   TRIG 75  --  146 152*   ALT  --  42 85* 58*   CR  --   --  0.94 0.88   GFRESTIMATED  --   --  62 67   GFRESTBLACK  --   --  75 81   POTASSIUM  --   --  4.2 4.3   TSH  --   --  2.10 1.40        Mammogram Screening: Patient over age 50, mutual decision to screen reflected in health maintenance.    Pertinent mammograms are reviewed under the imaging tab.  History of abnormal Pap smear: NO - age 30- 65 PAP every 3 years recommended  PAP / HPV Latest Ref Rng & Units 6/27/2018 5/26/2015 1/3/2012   PAP - NIL NIL NIL   HPV 16 DNA NEG:Negative Negative - -   HPV 18 DNA NEG:Negative Negative - -   OTHER HR HPV NEG:Negative Negative - -     Reviewed and updated as needed this visit by clinical staff  Tobacco  Allergies  Meds   Med Hx  Surg Hx  Fam Hx  Soc Hx        Reviewed and updated as needed this visit by Provider  Tobacco  Allergies  Meds  Problems  Med Hx  Surg Hx  Fam Hx         Past Medical History:   Diagnosis Date     Arthritis knees     Herpes zoster without mention of complication      Hypertension previously     Other and unspecified anterior pituitary hyperfunction 1990    saw endo; MRI; Elevated Prolactin level     Other and unspecified hyperlipidemia      Sarcoidosis 2004    BX per Pulmonary; Prednisone for a bit     Uncomplicated asthma younger     Unspecified hemorrhoids without mention of complication       Past Surgical History:   Procedure Laterality Date     ARTHROSCOPY KNEE RT/LT  4/30/2009    Dr. Hodges, Right     C RAD RESEC TONSIL/PILLARS  ~2005     COLONOSCOPY  2019     CYSTOSCOPY       LAPAROSCOPIC CHOLECYSTECTOMY  9/10/2008    Dr. Aguayo     THORACOSCOPIC BIOPSY RIB  2004    Dr. Johnson       Review of Systems   Constitutional: Negative for chills and fever.   HENT: Negative for congestion, ear pain, hearing loss and sore throat.    Eyes: Negative for pain and visual  "disturbance.   Respiratory: Positive for apnea (CPAP for years; used regularly.). Negative for cough and shortness of breath.    Cardiovascular: Negative for chest pain, palpitations and peripheral edema.   Gastrointestinal: Negative for abdominal pain, constipation, diarrhea, heartburn, hematochezia and nausea.   Breasts:  Negative for tenderness, breast mass and discharge.   Genitourinary: Negative for dysuria, frequency, genital sores, hematuria, pelvic pain, urgency, vaginal bleeding and vaginal discharge.   Musculoskeletal: Positive for arthralgias and myalgias. Negative for joint swelling.   Skin: Negative for rash.   Neurological: Positive for headaches (much improved wtih CPAP; odd sleep positions; Eccedrine). Negative for dizziness, weakness and paresthesias.   Psychiatric/Behavioral: Negative for mood changes. The patient is not nervous/anxious.         Sleep challenges;           OBJECTIVE:   /72   Pulse 77   Temp 98.6  F (37  C) (Oral)   Resp 15   Ht 1.704 m (5' 7.1\")   Wt 104.5 kg (230 lb 4.8 oz)   LMP 10/05/2020 (Approximate)   SpO2 96%   BMI 35.96 kg/m    Physical Exam  GENERAL: healthy, alert and no distress  EYES: Eyes grossly normal to inspection  HENT: ear canals and TM's normal, nose and mouth without ulcers or lesions  NECK: no adenopathy, no asymmetry, masses, or scars and thyroid normal to palpation  RESP: lungs clear to auscultation - no rales, rhonchi or wheezes  BREAST: normal without masses, tenderness or nipple discharge and no palpable axillary masses or adenopathy  CV: regular rates and rhythm, normal S1 S2, no S3 or S4, no murmur, click or rub, peripheral pulses strong and no peripheral edema  ABDOMEN: soft, nontender, no hepatosplenomegaly, no masses and bowel sounds normal  MS: no gross musculoskeletal defects noted, no edema  NEURO: Normal strength and tone, sensory exam grossly normal, mentation intact, gait normal including heel/toe/tandem walking and Romberg " "normal  PSYCH: mentation appears normal and affect normal/bright    Diagnostic Test Results:  Labs reviewed in Epic    ASSESSMENT/PLAN:   (Z00.00) Routine general medical examination at a health care facility  (primary encounter diagnosis)  Comment: declines pap. last done 6/2018  Plan: pt prefers to wait; she was not mentally prepared to have PAp done today    (G47.30) Sleep apnea, unspecified type  Comment: CPAP per sleep clinic  Plan:     (E66.01) Morbid obesity, unspecified obesity type (H)  Comment: active; exercise encouraged.   Plan:     (G47.9) Sleep disturbance  Comment: 10/2020  discussed and trying Trazodone 25-50 mg; some grogginess noted.  Other options Amitriptyline 10-20mg or Gabapentin 100-300mg/night.  Plan:     Patient has been advised of split billing requirements and indicates understanding: Yes  COUNSELING:  Reviewed preventive health counseling, as reflected in patient instructions       Regular exercise       Healthy diet/nutrition       Immunizations    Up todate         Colon cancer screening- 5 year follow up in 2023    Estimated body mass index is 35.96 kg/m  as calculated from the following:    Height as of this encounter: 1.704 m (5' 7.1\").    Weight as of this encounter: 104.5 kg (230 lb 4.8 oz).    Weight management plan: Discussed healthy diet and exercise guidelines    She reports that she has never smoked. She has never used smokeless tobacco.      Counseling Resources:  ATP IV Guidelines  Pooled Cohorts Equation Calculator  Breast Cancer Risk Calculator  BRCA-Related Cancer Risk Assessment: FHS-7 Tool  FRAX Risk Assessment  ICSI Preventive Guidelines  Dietary Guidelines for Americans, 2010  USDA's MyPlate  ASA Prophylaxis  Lung CA Screening    Dorene Waldron MD  Internal Medicine   [facility]  10 minutes in addition to HEALTH CARE MAINTENANCE are spent with patient, over 50% of that time spent providing counselling, discussing and reviewing medical " conditions/concerns, meds and potential side effects.

## 2021-01-11 ENCOUNTER — MYC MEDICAL ADVICE (OUTPATIENT)
Dept: FAMILY MEDICINE | Facility: CLINIC | Age: 56
End: 2021-01-11

## 2021-01-11 DIAGNOSIS — G47.9 SLEEP DISTURBANCE: Primary | ICD-10-CM

## 2021-01-11 DIAGNOSIS — G47.9 SLEEP DISORDER: ICD-10-CM

## 2021-01-22 ENCOUNTER — TRANSFERRED RECORDS (OUTPATIENT)
Dept: HEALTH INFORMATION MANAGEMENT | Facility: CLINIC | Age: 56
End: 2021-01-22

## 2021-02-05 RX ORDER — GABAPENTIN 100 MG/1
CAPSULE ORAL
Qty: 100 CAPSULE | Refills: 0 | Status: SHIPPED | OUTPATIENT
Start: 2021-02-05 | End: 2021-02-09

## 2021-02-08 RX ORDER — TRAZODONE HYDROCHLORIDE 50 MG/1
25-50 TABLET, FILM COATED ORAL AT BEDTIME
Qty: 90 TABLET | Refills: 1 | COMMUNITY
Start: 2021-02-08 | End: 2021-11-24

## 2021-02-08 NOTE — TELEPHONE ENCOUNTER
Spoke with Dr. Waldron.   She advised to put trazodone back on the med list.  She advised to take off gabapentin off if pt not taking.  Will ask via [employer] and remove if pt not taking.

## 2021-02-28 ENCOUNTER — MYC MEDICAL ADVICE (OUTPATIENT)
Dept: FAMILY MEDICINE | Facility: CLINIC | Age: 56
End: 2021-02-28

## 2021-02-28 DIAGNOSIS — N95.1 PERIMENOPAUSAL: Primary | ICD-10-CM

## 2021-07-13 ENCOUNTER — ANCILLARY PROCEDURE (OUTPATIENT)
Dept: MAMMOGRAPHY | Facility: CLINIC | Age: 56
End: 2021-07-13
Attending: INTERNAL MEDICINE
Payer: COMMERCIAL

## 2021-07-13 DIAGNOSIS — Z12.31 VISIT FOR SCREENING MAMMOGRAM: ICD-10-CM

## 2021-07-13 PROCEDURE — 77063 BREAST TOMOSYNTHESIS BI: CPT | Mod: TC | Performed by: RADIOLOGY

## 2021-07-13 PROCEDURE — 77067 SCR MAMMO BI INCL CAD: CPT | Mod: TC | Performed by: RADIOLOGY

## 2021-08-16 ENCOUNTER — OFFICE VISIT (OUTPATIENT)
Dept: FAMILY MEDICINE | Facility: CLINIC | Age: 56
End: 2021-08-16
Payer: COMMERCIAL

## 2021-08-16 VITALS
WEIGHT: 230 LBS | BODY MASS INDEX: 35.92 KG/M2 | TEMPERATURE: 98.7 F | DIASTOLIC BLOOD PRESSURE: 62 MMHG | SYSTOLIC BLOOD PRESSURE: 128 MMHG | HEART RATE: 76 BPM | OXYGEN SATURATION: 93 % | RESPIRATION RATE: 16 BRPM

## 2021-08-16 DIAGNOSIS — Z01.419 ENCOUNTER FOR GYNECOLOGICAL EXAMINATION WITHOUT ABNORMAL FINDING: Primary | ICD-10-CM

## 2021-08-16 DIAGNOSIS — T14.8XXA BRUISING: ICD-10-CM

## 2021-08-16 LAB
BASOPHILS # BLD AUTO: 0.1 10E3/UL (ref 0–0.2)
BASOPHILS NFR BLD AUTO: 1 %
EOSINOPHIL # BLD AUTO: 0.3 10E3/UL (ref 0–0.7)
EOSINOPHIL NFR BLD AUTO: 3 %
ERYTHROCYTE [DISTWIDTH] IN BLOOD BY AUTOMATED COUNT: 12.4 % (ref 10–15)
HCT VFR BLD AUTO: 42.3 % (ref 35–47)
HGB BLD-MCNC: 14.2 G/DL (ref 11.7–15.7)
LYMPHOCYTES # BLD AUTO: 2.6 10E3/UL (ref 0.8–5.3)
LYMPHOCYTES NFR BLD AUTO: 26 %
MCH RBC QN AUTO: 30.4 PG (ref 26.5–33)
MCHC RBC AUTO-ENTMCNC: 33.6 G/DL (ref 31.5–36.5)
MCV RBC AUTO: 91 FL (ref 78–100)
MONOCYTES # BLD AUTO: 0.9 10E3/UL (ref 0–1.3)
MONOCYTES NFR BLD AUTO: 9 %
NEUTROPHILS # BLD AUTO: 6.3 10E3/UL (ref 1.6–8.3)
NEUTROPHILS NFR BLD AUTO: 63 %
PLATELET # BLD AUTO: 260 10E3/UL (ref 150–450)
RBC # BLD AUTO: 4.67 10E6/UL (ref 3.8–5.2)
WBC # BLD AUTO: 10 10E3/UL (ref 4–11)

## 2021-08-16 PROCEDURE — 85025 COMPLETE CBC W/AUTO DIFF WBC: CPT | Performed by: INTERNAL MEDICINE

## 2021-08-16 PROCEDURE — 36415 COLL VENOUS BLD VENIPUNCTURE: CPT | Performed by: INTERNAL MEDICINE

## 2021-08-16 PROCEDURE — 87624 HPV HI-RISK TYP POOLED RSLT: CPT | Performed by: INTERNAL MEDICINE

## 2021-08-16 PROCEDURE — 85610 PROTHROMBIN TIME: CPT | Performed by: INTERNAL MEDICINE

## 2021-08-16 PROCEDURE — G0145 SCR C/V CYTO,THINLAYER,RESCR: HCPCS | Performed by: INTERNAL MEDICINE

## 2021-08-16 PROCEDURE — 85730 THROMBOPLASTIN TIME PARTIAL: CPT | Performed by: INTERNAL MEDICINE

## 2021-08-16 PROCEDURE — 99213 OFFICE O/P EST LOW 20 MIN: CPT | Performed by: INTERNAL MEDICINE

## 2021-08-16 RX ORDER — CETIRIZINE HYDROCHLORIDE 10 MG/1
10 TABLET ORAL DAILY PRN
COMMUNITY

## 2021-08-16 NOTE — PROGRESS NOTES
"    Assessment & Plan     (Z01.419) Encounter for gynecological examination without abnormal finding  (primary encounter diagnosis)  Comment: GYN exam  Plan: Pap imaged thin layer screen with HPV -         recommended age 30 - 65 years (select HPV order        below), HPV Hold (Lab Only), HPV High Risk         Types DNA Cervical          (T14.8XXA) Bruising  Comment: pt reporting  Plan: CBC with platelets and differential, Partial         thromboplastin time, INR            33 minutes spent on the date of the encounter doing chart review, history and exam, documentation and further activities per the note       BMI:   Estimated body mass index is 35.92 kg/m  as calculated from the following:    Height as of 1/5/21: 1.704 m (5' 7.1\").    Weight as of this encounter: 104.3 kg (230 lb).   Weight management plan: Discussed healthy diet and exercise guidelines        No follow-ups on file.    Dorene Waldron MD  Internal Medicine   [facility] KEELY Olivia is a 56 year old who presents for the following health issues     HPI     Pt here for pap smear.  Had a physical in January     How many servings of fruits and vegetables do you eat daily?  2-3    On average, how many sweetened beverages do you drink each day (Examples: soda, juice, sweet tea, etc.  Do NOT count diet or artificially sweetened beverages)?   0    How many days per week do you exercise enough to make your heart beat faster? 3 or less    How many minutes a day do you exercise enough to make your heart beat faster? 30 - 60    How many days per week do you miss taking your medication? 0    Concern - (when she was at dermatologist today they advised her to talk to her primary about bruising)   Onset: has been something she has noted for a long time  Description: bruising on her legs and arms at times  Intensity: mild, moderate  Progression of Symptoms:  same  Accompanying Signs & Symptoms: none  Previous history " of similar problem: no  Precipitating factors:        Worsened by: if she bumps herself but states at times is not aware of any injury   Alleviating factors:        Improved by: nothing   Therapies tried and outcome:  none     Was advised by dermatologist to wear SHAHID hose due to lower extremity edema.  Pt was not aware that her legs were swollen.       Review of Systems   CONSTITUTIONAL: NEGATIVE for fever, chills, change in weight  ENT/MOUTH: NEGATIVE for ear, mouth and throat problems  RESP: NEGATIVE for significant cough or SOB  CV: NEGATIVE for chest pain, palpitations or peripheral edema  : normal menstrual cycles  MUSCULOSKELETAL: NEGATIVE for significant arthralgias or myalgia  NEURO: NEGATIVE for weakness, dizziness or paresthesias  PSYCHIATRIC: NEGATIVE for changes in mood or affect      Objective    /62   Pulse 76   Temp 98.7  F (37.1  C) (Oral)   Resp 16   Wt 104.3 kg (230 lb)   LMP 08/07/2021 (Exact Date)   SpO2 93%   BMI 35.92 kg/m    Body mass index is 35.92 kg/m .  Physical Exam   GENERAL: healthy, alert and no distress  NECK: no adenopathy, no asymmetry, masses, or scars and thyroid normal to palpation  RESP: lungs clear to auscultation - no rales, rhonchi or wheezes  CV: regular rate and rhythm, normal S1 S2, no S3 or S4, no murmur, click or rub, no peripheral edema and peripheral pulses strong  ABDOMEN: soft, nontender, no hepatosplenomegaly, no masses and bowel sounds normal   (female): normal female external genitalia, normal urethral meatus , vaginal mucosa pink, moist, well rugated, normal cervix, adnexae, and uterus without masses. and pap obtained  MS: no gross musculoskeletal defects noted, no edema

## 2021-08-17 LAB
APTT PPP: 33 SECONDS (ref 22–38)
INR PPP: 0.97 (ref 0.85–1.15)

## 2021-08-19 LAB
BKR LAB AP GYN ADEQUACY: NORMAL
BKR LAB AP GYN INTERPRETATION: NORMAL
BKR LAB AP GYN OTHER FINDINGS: NORMAL
BKR LAB AP HPV REFLEX: NORMAL
BKR LAB AP LMP: NORMAL
BKR LAB AP PREVIOUS ABNORMAL: NORMAL
PATH REPORT.COMMENTS IMP SPEC: NORMAL
PATH REPORT.RELEVANT HX SPEC: NORMAL

## 2021-08-20 LAB
HUMAN PAPILLOMA VIRUS 16 DNA: NEGATIVE
HUMAN PAPILLOMA VIRUS 18 DNA: NEGATIVE
HUMAN PAPILLOMA VIRUS FINAL DIAGNOSIS: NORMAL
HUMAN PAPILLOMA VIRUS OTHER HR: NEGATIVE

## 2021-08-23 ENCOUNTER — TELEPHONE (OUTPATIENT)
Dept: FAMILY MEDICINE | Facility: CLINIC | Age: 56
End: 2021-08-23

## 2021-08-23 DIAGNOSIS — R87.618 UNEXPLAINED ENDOMETRIAL CELLS ON CERVICAL PAP SMEAR: Primary | ICD-10-CM

## 2021-08-23 NOTE — TELEPHONE ENCOUNTER
8/16/21 Co-test = Normal, Neg HPV, endometrial cells present that are reported as correlating with menstrual history    LMP date of 8/7/21  Clinical information from pap: Irregular bleeding      Hi Dr. Waldron- Please advise, there is no documenation on menstrual cycle in recent visit notes but they are not quite completed yet.  Would you like any follow up on presence of endometrial cells on pap?    Thank you  Lynette Nissen RN

## 2021-08-31 ENCOUNTER — OFFICE VISIT (OUTPATIENT)
Dept: FAMILY MEDICINE | Facility: CLINIC | Age: 56
End: 2021-08-31
Payer: COMMERCIAL

## 2021-08-31 VITALS
HEIGHT: 67 IN | OXYGEN SATURATION: 96 % | HEART RATE: 76 BPM | TEMPERATURE: 98.6 F | RESPIRATION RATE: 15 BRPM | WEIGHT: 239.5 LBS | BODY MASS INDEX: 37.59 KG/M2 | DIASTOLIC BLOOD PRESSURE: 80 MMHG | SYSTOLIC BLOOD PRESSURE: 132 MMHG

## 2021-08-31 DIAGNOSIS — G47.33 OSA (OBSTRUCTIVE SLEEP APNEA): ICD-10-CM

## 2021-08-31 DIAGNOSIS — Z01.818 PREOP GENERAL PHYSICAL EXAM: Primary | ICD-10-CM

## 2021-08-31 DIAGNOSIS — H02.33 REDUNDANT EYELID OF BOTH EYES: ICD-10-CM

## 2021-08-31 DIAGNOSIS — H02.36 REDUNDANT EYELID OF BOTH EYES: ICD-10-CM

## 2021-08-31 PROCEDURE — 99214 OFFICE O/P EST MOD 30 MIN: CPT | Performed by: INTERNAL MEDICINE

## 2021-08-31 RX ORDER — PHENOL 1.4 %
10 AEROSOL, SPRAY (ML) MUCOUS MEMBRANE
COMMUNITY
End: 2023-10-18

## 2021-08-31 RX ORDER — TRETINOIN 0.25 MG/G
CREAM TOPICAL DAILY PRN
COMMUNITY
Start: 2021-08-16

## 2021-08-31 RX ORDER — ACETAMINOPHEN 500 MG
500-1000 TABLET ORAL EVERY 6 HOURS PRN
COMMUNITY

## 2021-08-31 ASSESSMENT — MIFFLIN-ST. JEOR: SCORE: 1703.99

## 2021-08-31 NOTE — PATIENT INSTRUCTIONS

## 2021-09-03 NOTE — TELEPHONE ENCOUNTER
Will re- route to Dr. Waldron as high priority  Please see below and advise for follow up    Thank you  Lynette Nissen RN

## 2021-09-17 NOTE — TELEPHONE ENCOUNTER
Xu Greer-  Could I please ask you to address this request for recommendation on pt's pap result?  Endometrial cells are not included in our Pap RN guidelines so I am not able to provide recommendations.  I am needing to send to the medical director per our Pap RN workflow as this is a 4th request.    Please see below:  8/16/21 Co-test = Normal, Neg HPV, endometrial cells present that are reported as correlating with menstrual history     LMP date of 8/7/21  Clinical information from pap: Irregular bleeding       Xu Waldron- Please advise, there is no documenation on menstrual cycle in recent visit notes but they are not quite completed yet.  Would you like any follow up on presence of endometrial cells on pap?     Thank you  Lynette Nissen RN

## 2021-09-17 NOTE — TELEPHONE ENCOUNTER
Pt is going to need a pelvic ultrasound due to endometrial cells on pap. Please notify pt and I will order.   I will included Dr Waldron on this so she is aware.

## 2021-09-20 NOTE — TELEPHONE ENCOUNTER
Patient called back and we discussed additional finding of endometrial cells on her pap. She does report that for the last 6 months or so she has been having heavier menstrual cycles and for the last year, has been noting irregularity off and on.  We discussed pelvic ultrasound recommendation and she will call them back to get this scheduled.    Lynette Nissen RN

## 2021-09-20 NOTE — TELEPHONE ENCOUNTER
Pt calling wondering about imaging ordered by another doctor and why there was not follow up?     Adv of note w/ Pap nurse and Dr. Greer.     She will continue on with recommendations.     Regina BOOKER RN

## 2021-09-30 ENCOUNTER — ANCILLARY PROCEDURE (OUTPATIENT)
Dept: ULTRASOUND IMAGING | Facility: CLINIC | Age: 56
End: 2021-09-30
Attending: FAMILY MEDICINE
Payer: COMMERCIAL

## 2021-09-30 DIAGNOSIS — R87.618 UNEXPLAINED ENDOMETRIAL CELLS ON CERVICAL PAP SMEAR: ICD-10-CM

## 2021-09-30 PROCEDURE — 76856 US EXAM PELVIC COMPLETE: CPT | Performed by: OBSTETRICS & GYNECOLOGY

## 2021-09-30 PROCEDURE — 76830 TRANSVAGINAL US NON-OB: CPT | Performed by: OBSTETRICS & GYNECOLOGY

## 2021-10-05 ENCOUNTER — MYC MEDICAL ADVICE (OUTPATIENT)
Dept: FAMILY MEDICINE | Facility: CLINIC | Age: 56
End: 2021-10-05

## 2021-10-05 DIAGNOSIS — D25.9 UTERINE LEIOMYOMA, UNSPECIFIED LOCATION: Primary | ICD-10-CM

## 2021-10-05 DIAGNOSIS — R87.619 ABNORMAL CERVICAL PAPANICOLAOU SMEAR, UNSPECIFIED ABNORMAL PAP FINDING: ICD-10-CM

## 2021-10-06 NOTE — TELEPHONE ENCOUNTER
Call to pt - she would like to move forward w/ GYN referral and the biopsy.     Regina BOOKER RN      Statement Selected

## 2021-10-24 ENCOUNTER — HEALTH MAINTENANCE LETTER (OUTPATIENT)
Age: 56
End: 2021-10-24

## 2021-11-01 ENCOUNTER — OFFICE VISIT (OUTPATIENT)
Dept: OBGYN | Facility: CLINIC | Age: 56
End: 2021-11-01
Payer: COMMERCIAL

## 2021-11-01 VITALS — SYSTOLIC BLOOD PRESSURE: 142 MMHG | WEIGHT: 236.9 LBS | DIASTOLIC BLOOD PRESSURE: 84 MMHG | BODY MASS INDEX: 37.1 KG/M2

## 2021-11-01 DIAGNOSIS — R87.619 ENDOMETRIAL CELLS ON CERVICAL PAP SMEAR INCONSISTENT W/LMP: Primary | ICD-10-CM

## 2021-11-01 PROCEDURE — 88305 TISSUE EXAM BY PATHOLOGIST: CPT

## 2021-11-01 PROCEDURE — 58100 BIOPSY OF UTERUS LINING: CPT | Performed by: FAMILY MEDICINE

## 2021-11-01 PROCEDURE — 99204 OFFICE O/P NEW MOD 45 MIN: CPT | Mod: 25 | Performed by: FAMILY MEDICINE

## 2021-11-01 NOTE — NURSING NOTE
"Chief Complaint   Patient presents with     Minor Procedure   c/o heavy menses x 4 months through 8/7/21.  No menses since.    initial BP (!) 142/84   Wt 107.5 kg (236 lb 14.4 oz)   LMP 08/07/2021   BMI 37.10 kg/m   Estimated body mass index is 37.1 kg/m  as calculated from the following:    Height as of 8/31/21: 1.702 m (5' 7\").    Weight as of this encounter: 107.5 kg (236 lb 14.4 oz).  BP completed using cuff size large.  Rani Escalona CMA    "

## 2021-11-01 NOTE — PROGRESS NOTES
SUBJECTIVE:  Ne Chavis is an 56 year old No obstetric history on file.  woman who presents for   gynecology consult for endometrial cells on pap smear, ultrasound showing 6 cm fibroid, but endometrial lining not visible   Due to fibroid. LMP was in 2021.          No LMP recorded. Periods are irregular, skipping every other month, lasting   5 days, and then last 7 months about 7-8 days. Menarche @ age teenager, Dysmenorrhea:mild, occurring first 1-2 days of flow. Cyclic symptoms   include none. No intermenstrual bleeding,   spotting, or discharge.    Current contraception: condoms  History of abnormal Pap smear: Yes: endometrial cells on pap smear  Family history of uterine or ovarian cancer: No  History of abnormal mammogram: No  Family history of breast cancer: No        Past Medical History:   Diagnosis Date     Arthritis knees     Herpes zoster without mention of complication      Hypertension previously     Other and unspecified anterior pituitary hyperfunction     saw endo; MRI; Elevated Prolactin level     Other and unspecified hyperlipidemia      Sarcoidosis 2004    BX per Pulmonary; Prednisone for a bit     Uncomplicated asthma younger     Unspecified hemorrhoids without mention of complication           Family History   Problem Relation Age of Onset     Hypertension Mother      Arthritis Mother         osteoarthritis     Cerebrovascular Disease Mother      Osteoporosis Mother      Cancer - colorectal Father          at age 64; Dx @61     Colon Cancer Father      Diabetes Maternal Grandmother      Thyroid Disease Sister      Hypertension Sister      Hypertension Sister      Hypertension Sister      Hypertension Sister      Hypertension Sister      Diabetes Sister      Obesity Sister      Hypertension Sister        Past Surgical History:   Procedure Laterality Date     ARTHROSCOPY KNEE RT/LT  2009    Dr. Hodges, Right     C RAD RESEC TONSIL/PILLARS  ~     COLONOSCOPY  2019      CYSTOSCOPY       LAPAROSCOPIC CHOLECYSTECTOMY  9/10/2008    Dr. Aguayo     THORACOSCOPIC BIOPSY RIB  2004    Dr. Johnson       Current Outpatient Medications   Medication     acetaminophen (TYLENOL) 500 MG tablet     calcium carbonate-vitamin D (OSCAL W/D) 500-200 MG-UNIT tablet     cetirizine (ZYRTEC) 10 MG tablet     magnesium 100 MG CAPS     Melatonin 10 MG TABS tablet     Misc Natural Products (GLUCOSAMINE CHONDROITIN ADV PO)     MULTI-DAY VITAMINS OR     OMEGA 3 1000 MG OR CAPS     traZODone (DESYREL) 50 MG tablet     tretinoin (RETIN-A) 0.025 % external cream     Current Facility-Administered Medications   Medication     lidocaine 2 % (URO-JET) jelly 5 mL     Allergies   Allergen Reactions     No Known Drug Allergies        Social History     Tobacco Use     Smoking status: Never Smoker     Smokeless tobacco: Never Used   Substance Use Topics     Alcohol use: Yes     Comment: very little       Review Of Systems  Ears/Nose/Throat: negative  Respiratory: No shortness of breath, dyspnea on exertion, cough, or hemoptysis  Cardiovascular: negative  Gastrointestinal: negative  Genitourinary: See HPI   Constitutional, HEENT, cardiovascular, pulmonary, GI, , musculoskeletal, neuro, skin, endocrine and psych systems are negative, except as otherwise noted.    OBJECTIVE:  BP (!) 142/84   Wt 107.5 kg (236 lb 14.4 oz)   LMP 08/07/2021   BMI 37.10 kg/m     General appearance: healthy, alert and no distress  Skin: Skin color, texture, turgor normal. No rashes or lesions.  Ears: negative  Nose/Sinuses: Nares normal. Septum midline. Mucosa normal. No drainage or sinus tenderness.  Oropharynx: Lips, mucosa, and tongue normal. Teeth and gums normal.  Neck: Neck supple. No adenopathy. Thyroid symmetric, normal size,, Carotids without bruits.  Lungs: negative, Percussion normal. Good diaphragmatic excursion. Lungs clear  Heart: negative, PMI normal. No lifts, heaves, or thrills. RRR. No murmurs, clicks gallops or  rub  Breasts: deferred  Abdomen: Abdomen soft, non-tender. BS normal. No masses, organomegaly  Pelvic: Pelvic:  Pelvic examination with no pap/no Gonorrhea and Chlamydia   External genitalia normal   and vagina normal rugatted not atrophic  Examination of urethra normal no masses, tenderness, scarring  bladder, no masses or tenderness  Cervix no lesions or discharge  Bimanual exam with   Uterus 10 weeks size, midno position, mobile,no-tenderness, normal  descent   Adnexa/parametria  Normal no masses     Procedure:  Endometrial biopsy    Indication: Menometrorrhagia with age >35                   Endometrial cells on pap and age >40    Discussed risk of bleeding, infection, uterine perforation, cramping pain.  Pt agreed to proceed with procedure after all questions answered. INFORMED CONSENT OBTAINED   TIME OUT DONE PRIOR TO PROCEDURE:   STATING PROCEDURE NAME     Speculum placed and cervix visualized.  Cervix cleansed with betadine x 3.  Tenaculum placed on anterior lip of the cervix.  Endometrial biopsy pipelle passed through cervix and uterus sounded to 8 cm.  Biopsy specimen collected with one pass with return of moderate amount of pink tissue.  Specimen placed in a labeled container and set aside to be sent to pathology.  Tenaculum removed from the cervix and sites hemostatic.  No bleeding noted from cervical os.     Patient tolerated the procedure well.  There were no apparent complications and bleeding was minimal.    She is instructed to use no tampons and have no intercourse for the next 5 days.      ASSESSMENT:  Ne Chavis is an 56 year old  woman who presents for   gynecology consult for endometrial cells on pap smear, ultrasound showing 6 cm fibroid, but endometrial lining not visible   Due to fibroid. LMP was in august 2021.   PLAN:  Dx:  1)  Endometrial cells on pap smear, menometrorrhagia:  Reviewed pelvic ultrasound and recommend endometrial sampling, discussed endometrial biopsy and dilation and  curettage.           Labs were reviewed in [facility]   Imaging was reviewed in Epic   Tests and documents were reviewed.   Discussion of management or test interpretation     Dr. Margie Lew,     Obstetrics and Gynecology  [facility]3] - [place] [facility] [place]

## 2021-11-01 NOTE — PATIENT INSTRUCTIONS
Return as needed   Yearly exams     Dr. Margie Lew, DO    Obstetrics and Gynecology  [facility] [place] and [place]

## 2021-11-03 LAB
PATH REPORT.COMMENTS IMP SPEC: NORMAL
PATH REPORT.FINAL DX SPEC: NORMAL
PATH REPORT.GROSS SPEC: NORMAL
PATH REPORT.MICROSCOPIC SPEC OTHER STN: NORMAL
PATH REPORT.RELEVANT HX SPEC: NORMAL
PHOTO IMAGE: NORMAL

## 2021-11-22 DIAGNOSIS — G47.9 SLEEP DISTURBANCE: ICD-10-CM

## 2021-11-22 DIAGNOSIS — G47.9 SLEEP DISORDER: ICD-10-CM

## 2021-11-24 RX ORDER — TRAZODONE HYDROCHLORIDE 50 MG/1
25-50 TABLET, FILM COATED ORAL AT BEDTIME
Qty: 90 TABLET | Refills: 0 | Status: SHIPPED | OUTPATIENT
Start: 2021-11-24 | End: 2022-11-23

## 2021-11-24 NOTE — TELEPHONE ENCOUNTER
Prescription approved per [facility] Refill Protocol.    Nika Weaver RN

## 2021-12-29 ENCOUNTER — TRANSFERRED RECORDS (OUTPATIENT)
Dept: HEALTH INFORMATION MANAGEMENT | Facility: CLINIC | Age: 56
End: 2021-12-29
Payer: COMMERCIAL

## 2022-02-13 ENCOUNTER — HEALTH MAINTENANCE LETTER (OUTPATIENT)
Age: 57
End: 2022-02-13

## 2022-08-15 ENCOUNTER — ANCILLARY PROCEDURE (OUTPATIENT)
Dept: MAMMOGRAPHY | Facility: CLINIC | Age: 57
End: 2022-08-15
Attending: INTERNAL MEDICINE
Payer: COMMERCIAL

## 2022-08-15 DIAGNOSIS — Z12.31 VISIT FOR SCREENING MAMMOGRAM: ICD-10-CM

## 2022-08-15 PROCEDURE — 77063 BREAST TOMOSYNTHESIS BI: CPT | Mod: TC | Performed by: RADIOLOGY

## 2022-08-15 PROCEDURE — 77067 SCR MAMMO BI INCL CAD: CPT | Mod: TC | Performed by: RADIOLOGY

## 2022-09-02 ENCOUNTER — TRANSFERRED RECORDS (OUTPATIENT)
Dept: HEALTH INFORMATION MANAGEMENT | Facility: CLINIC | Age: 57
End: 2022-09-02

## 2022-09-02 DIAGNOSIS — N95.1 MENOPAUSAL AND FEMALE CLIMACTERIC STATES: ICD-10-CM

## 2022-09-02 NOTE — TELEPHONE ENCOUNTER
Routing refill request to provider for review/approval because:  Drug not active on patient's medication list  Patient has upcoming appointment with PCP    Eldon Marie RN on 9/2/2022 at 3:54 PM

## 2022-09-07 NOTE — PATIENT INSTRUCTIONS
"MY TREATMENT INFORMATION FOR SLEEP APNEA-  Ne Chavis    DOCTOR : Sandip VELASCO [facility]  SLEEP CENTER :  [place]  MY CONTACT NUMBER:[phone]      Sleep apnea: Sleep study ordered.    Follow up in sleep clinic 1-2 weeks after sleep study to discuss results of sleep study and treatment options.    Patient was advised not to drive if drowsy or sleepy.    Frequently asked questions:  1. What is Obstructive Sleep Apnea (EDELMIRA)? EDELMIRA is the most common type of sleep apnea. Apnea literally means, \"without breath.\" It is characterized by repetitive pauses in breathing, despite continued effort to breathe, and is usually associated with a reduction in blood oxygen saturation. Apneas can last 10 to over 60 seconds. It is caused by narrowing or collapse of the upper airway as muscles relax during sleep. Severity of sleep apnea is determined by frequency of breathing events and their effect on your sleep and oxygen levels determined during sleep testing.   2. What are the consequences of EDELMIRA? Symptoms include: daytime sleepiness- possibly increasing the risk of falling asleep while driving, unrefreshing/restless sleep, snoring, insomnia, waking frequently to urinate, waking with heartburn or reflux, reduced concentration and memory, and morning headaches. Other health consequences may include development of high blood pressure and other cardiovascular disease in persons who are susceptible. Untreated EDELMIRA  can contribute to heart disease, stroke and diabetes.   3. What are the treatment options? In most situations, sleep apnea is a lifelong disease that must be managed with daily therapy. Medications are not effective for sleep apnea and surgery is generally not performed until other therapies have been tried. Therapy is usually tailored to the individual patient based on many factors including your wishes as well as severity of sleep apnea and severity of obesity. Continuous Positive Airway (CPAP) is the most reliable " Daily Note     Today's date: 2022  Patient name: Arlene Gilliland  : 1958  MRN: [id number]  Referring provider: AMBROSIO Ferguson*  Dx:   Encounter Diagnosis     ICD-10-CM    1  Right hip pain  M25 551    2  Right hip abductor tendon s/s  S76 019A                   Subjective: "It's gotten a lot better in the last week or so "    Objective: See treatment diary below    Assessment: Performed exercise program w/o difficulty or discomfort  Responded well to manual therapies  Issued updated written HEP instructions, reviewed same w/pt  Will monitor  Progress as appropriate  Plan: Cont per POC      Precautions: none      Manuals          Inf/lat hip mobs  inf/lat glides with belt  JLW inf/lat glides with belt  JLW Inf/lat  Glides  W/belt  KT         gentle HS stretch   JLW MO                                   Neuro Re-Ed                                                                                                        Ther Ex             ITB stretch with strap 3x30" 3x30" 3x30" 3x30"         Supine hip flexor stretch 3x30" 3x30" 3x30" 3x30"         Figure 4 stretch 3x30" 3x30" 3x30" 3x30"         Bridging with ball sq  5"x20 5"x20 5"x20         Supine SLR with ER  20 20 20         R SL clamshells  5"x20 5"x20 5"x20         Prone hip ext  20 20 20         Prone donkey kick  20 20 20         Prone ER ball sq  5"x20 5"x20 5"x20                                                             Ther Activity             upright bike  8' recum  8' recum  8'         step ups (lat/cross)  8"  20 ea 8"  20 ea 8"  20 ea treatment. An oral device to hold your jaw forward is usually the next most reliable option. Other options include postioning devices (to keep you off your back), weight loss, and surgery including a tongue pacing device. There is more detail about some of these options below.            1. CPAP-  WHAT DOES IT DO AND HOW CAN I LEARN TO WEAR IT?                               BEFORE I START, CAN I WATCH A MOVIE TO GET A PLAN ON HOW TO USE CPAP?  https://www.Trueffect.com/watch?i=f4A95kt699V      Continuous positive airway pressure, or CPAP, is the most effective treatment for obstructive sleep apnea. It works by blowing room air, through a mask, to hold your throat open. A decision to use CPAP is a major step forward in the pursuit of a healthier life. The successful use of CPAP will help you breathe easier, sleep better and live healthier. You can choose CPAP equipment from any durable medical equipment provider that meets your needs.  Using CPAP can be a positive experience if you keep these hobson points in mind:  1. Commitment  CPAP is not a quick fix for your problem. It involves a long-term commitment to improve your sleep and your health.    2. Communication  Stay in close communication with both your sleep doctor and your CPAP supplier. Ask lots of questions and seek help when you need it.    3. Consistency  Use CPAP all night, every night and for every nap. You will receive the maximum health benefits from CPAP when you use it every time that you sleep. This will also make it easier for your body to adjust to the treatment.    4. Correction  The first machine and mask that you try may not be the best ones for you. Work with your sleep doctor and your CPAP supplier to make corrections to your equipment selection. Ask about trying a different type of machine or mask if you have ongoing problems. Make sure that your mask is a good fit and learn to use your equipment properly.    5. Challenge  Tell a family member or  "close friend to ask you each morning if you used your CPAP the previous night. Have someone to challenge you to give it your best effort.    6. Connection   Your adjustment to CPAP will be easier if you are able to connect with others who use the same treatment. Ask your sleep doctor if there is a support group in your area for people who have sleep apnea, or look for one on the Internet.  7. Comfort   Increase your level of comfort by using a saline spray, decongestant or heated humidifier if CPAP irritates your nose, mouth or throat. Use your unit's \"ramp\" setting to slowly get used to the air pressure level. There may be soft pads you can buy that will fit over your mask straps. Look on www.CPAP.com for accessories that can help make CPAP use more comfortable.  8. Cleaning   Clean your mask, tubing and headgear on a regular basis. Put this time in your schedule so that you don't forget to do it. Check and replace the filters for your CPAP unit and humidifier.    9. Completion   Although you are never finished with CPAP therapy, you should reward yourself by celebrating the completion of your first month of treatment. Expect this first month to be your hardest period of adjustment. It will involve some trial and error as you find the machine, mask and pressure settings that are right for you.    10. Continuation  After your first month of treatment, continue to make a daily commitment to use your CPAP all night, every night and for every nap.    CPAP-Tips to starting with success:  Begin using your CPAP for short periods of time during the day while you watch TV or read.    Use CPAP every night and for every nap. Using it less often reduces the health benefits and makes it harder for your body to get used to it.    Make small adjustments to your mask, tubing, straps and headgear until you get the right fit. Tightening the mask may actually worsen the leak.  If it leaves significant marks on your face or irritates " the bridge of your nose, it may not be the best mask for you.  Speak with the person who supplied the mask and consider trying other masks. Insurances will allow you to try different masks during the first month of starting CPAP.  Insurance also covers a new mask, hose and filter about every 6 months.    Use a saline nasal spray to ease mild nasal congestion. Neti-Pot or saline nasal rinses may also help. Nasal gel sprays can help reduce nasal dryness.  Biotene mouthwash can be helpful to protect your teeth if you experience frequent dry mouth.  Dry mouth may be a sign of air escaping out of your mouth or out of the mask in the case of a full face mask.  Speak with your provider if you expect that is the case.     Take a nasal decongestant to relieve more severe nasal or sinus congestion.  Do not use Afrin (oxymetazoline) nasal spray more than 3 days in a row.  Speak with your sleep doctor if your nasal congestion is chronic.    Use a heated humidifier that fits your CPAP model to enhance your breathing comfort. Adjust the heat setting up if you get a dry nose or throat, down if you get condensation in the hose or mask.  Position the CPAP lower than you so that any condensation in the hose drains back into the machine rather than towards the mask.    Try a system that uses nasal pillows if traditional masks give you problems.    Clean your mask, tubing and headgear once a week. Make sure the equipment dries fully.    Regularly check and replace the filters for your CPAP unit and humidifier.    Work closely with your sleep provider and your CPAP supplier to make sure that you have the machine, mask and air pressure setting that works best for you. It is better to stop using it and call your provider to solve problems than to lay awake all night frustrated with the device.    BESIDES CPAP, WHAT OTHER THERAPIES ARE THERE?      Positioning Device  Positioning devices are generally used when sleep apnea is mild and only  occurs on your back.This example shows a pillow that straps around the waist. It may be appropriate for those whose sleep study shows milder sleep apnea that occurs primarily when lying flat on one's back. Preliminary studies have shown benefit but effectiveness at home may need to be verified by a home sleep test. These devices are generally not covered by medical insurance.                      Oral Appliance  What is oral appliance therapy?  An oral appliance is a small acrylic device that fits over the upper and lower teeth or tongue (similar to an orthodontic retainer or a mouth guard). This device slightly advances the lower jaw or tongue, which moves the base of the tongue forward, opens the airway, improves breathing and can effectively treat snoring and obstructive sleep apnea sleep apnea. The appliance is fabricated and customized by a qualified dentist with experience in treating snoring and sleep apnea. Oral appliances are usually well tolerated and have relatively high compliance by patients1, 2, 3.  When is an oral appliance indicated?  Oral appliance therapy is recommended as a first-line treatment for patients with primary snoring, mild sleep apnea, and for patients with moderate sleep apnea who prefer appliance therapy to use of CPAP4, 5. Severity of sleep apnea is determined by sleep testing and is based on the number of respiratory events per hour of sleep.   How successful is oral appliance therapy?  The success rate of oral appliance therapy in patients with mild sleep apnea is 75-80% while in patients with moderate sleep apnea it is 50-70%. The chance of success in patients with severe sleep apnea is 40-50%. The research also shows that oral appliances have a beneficial effect on the cardiovascular health of EDELMIRA patients at the same magnitude as CPAP therapy7.  Oral appliances should be a second-line treatment in cases of severe sleep apnea, but if not completely successful then a combination  therapy utilizing CPAP plus oral appliance therapy may be effective. Oral appliances tend to be effective in a broad range of patients although studies show that the patients who have the highest success are females, younger patients, those with milder disease, and less severe obesity. 3, 6.   The chances of success are lower in patients who have more severe EDELMIRA, are older, and those who are morbidly obese.     Example of an oral appliance   Finding a dentist that practices dental sleep medicine  Specific training is available through the American Academy of Dental Sleep Medicine for dentists interested in working in the field of sleep. To find a dentist who is educated in the field of sleep and the use of oral appliances, near you, visit the Web site of the American Academy of Dental Sleep Medicine; also see   http://www.accpstorage.org/newOrganization/patients/oralAppliances.pdf  To search for a dentist certified in these practices:  Http://aadsm.org/FindADentist.aspx?1  1. Denisa, et al. Objectively measured vs self-reported compliance during oral appliance therapy for sleep-disordered breathing. Chest 2013; 144(5): 4661-6626.  2. Caroline, et al. Objective measurement of compliance during oral appliance therapy for sleep-disordered breathing. Thorax 2013; 68(1): 91-96.  3. Ludin et al. Mandibular advancement devices in 620 men and women with EDELMIRA and snoring: tolerability and predictors of treatment success. Chest 2004; 125: 4163-5656.  4. Babcock, et al. Oral appliances for snoring and EDELMIRA: a review. Sleep 2006; 29: 244-262.  5. Meme et al. Oral appliance treatment for EDELMIRA: an update. J Clin Sleep Med 2014; 10(2): 215-227.  6. Familiaekema, et al. Predictors of OSAH treatment outcome. J Dent Res 2007; 86: 5769-1043.    Nasal Valves                 Nasal valves may not be effective if you have frequent nasal congestion or have difficulty breathing through your nose. They may be an option for mild  apnea if other options are not well tolerated. The efficacy of these devices is generally less than CPAP or oral appliances.      Weight Loss:    Weight management is a personal decision.  If you are interested in exploring weight loss strategies, the following discussion covers the impact on weight loss on sleep apnea and the approaches that may be successful.    Weight loss decreases severity of sleep apnea in most people with obesity. For those with mild obesity who have developed snoring with weight gain, even 15-30 pound weight loss can improve and occasionally eliminate sleep apnea.  Structured and life-long dietary and health habits are necessary to lose weight and keep healthier weight levels.     Though there may be significant health benefits from weight loss, long-term weight loss is very difficult to achieve- studies show success with dietary management in less than 10% of people. In addition, substantial weight loss may require years of dietary control and may be difficult if patients have severe obesity. In these cases, surgical management may be considered.  Finally, older individuals who have tolerated obesity without health complications may be less likely to benefit from weight loss strategies.    Your BMI is Body mass index is 39.94 kg/(m^2).  Body mass index (BMI) is one way to tell whether you are at a healthy weight, overweight, or obese. It measures your weight in relation to your height.  A BMI of 18.5 to 24.9 is in the healthy range. A person with a BMI of 25 to 29.9 is considered overweight, and someone with a BMI of 30 or greater is considered obese. More than two-thirds of American adults are considered overweight or obese.  Being overweight or obese increases the risk for further weight gain. Excess weight may lead to heart disease and diabetes.  Creating and following plans for healthy eating and physical activity may help you improve your health.  Weight control is part of healthy  lifestyle and includes exercise, emotional health, and healthy eating habits. Careful eating habits lifelong are the mainstay of weight control. Though there are significant health benefits from weight loss, long-term weight loss with diet alone may be very difficult to achieve- studies show long-term success with dietary management in less than 10% of people. Attaining a healthy weight may be especially difficult to achieve in those with severe obesity. In some cases, medications, devices and surgical management might be considered.  What can you do?  If you are overweight or obese and are interested in methods for weight loss, you should discuss this with your provider.     Consider reducing daily calorie intake by 500 calories.     Keep a food journal.     Avoiding skipping meals, consider cutting portions instead.    Diet combined with exercise helps maintain muscle while optimizing fat loss. Strength training is particularly important for building and maintaining muscle mass. Exercise helps reduce stress, increase energy, and improves fitness. Increasing exercise without diet control, however, may not burn enough calories to loose weight.       Start walking three days a week 10-20 minutes at a time    Work towards walking thirty minutes five days a week     Eventually, increase the speed of your walking for 1-2 minutes at time    In addition, we recommend that you review healthy lifestyles and methods for weight loss available through the National Institutes of Health patient information sites:  http://win.niddk.nih.gov/publications/index.htm    And look into health and wellness programs that may be available through your health insurance provider, employer, local community center, or karen club.    Weight management plan: Patient was referred to their PCP to discuss a diet and exercise plan.    Surgery:    Upper Airway Surgery for EDELMIRA  Surgery for EDELMIRA is a second-line treatment option in the management of  sleep apnea.  Surgery should be considered for patients who are having a difficult time tolerating CPAP.    Surgery for EDELMIRA is directed at areas that are responsible for narrowing or complete obstruction of the airway during sleep.  There are a wide range of procedures available to enlarge and/or stabilize the airway to prevent blockage of breathing in the three major areas where it can occur: the palate, tongue, and nasal regions.  Successful surgical treatment depends on the accurate identification of the factors responsible for obstructive sleep apnea in each person.  A personalized approach is required because there is no single treatment that works well for everyone.  Because of anatomic variation, consultation with an examination by a sleep surgeon is a critical first step in determining what surgical options are best for each patient.  In some cases, examination during sedation may be recommended in order to guide the selection of procedures.  Patients will be counseled about risks and benefits as well as the typical recovery course after surgery. Surgery is typically not a cure for a person s EDELMIRA.  However, surgery will often significantly improve one s EDELMIRA severity (termed  success rate ).  Even in the absence of a cure, surgery will decrease the cardiovascular risk associated with OSA7; improve overall quality of life8 (sleepiness, functionality, sleep quality, etc).          Palate Procedures:  Patients with EDELMIRA often have narrowing of their airway in the region of their tonsils and uvula.  The goals of palate procedures are to widen the airway in this region as well as to help the tissues resist collapse.  Modern palate procedure techniques focus on tissue conservation and soft tissue rearrangement, rather than tissue removal.  Often the uvula is preserved in this procedure. Residual sleep apnea is common in patient after pharyngoplasty with an average reduction in sleep apnea events of 33%2.      Tongue  Procedures:  While patients are awake, the muscles that surround the throat are active and keep this region open for breathing. These muscles relax during sleep, allowing the tongue and other structures to collapse and block breathing.  There are several different tongue procedures available.  Selection of a tongue base procedure depends on characteristics seen on physical exam.  Generally, procedures are aimed at removing bulky tissues in this area or preventing the back of the tongue from falling back during sleep.  Success rates for tongue surgery range from 50-62%3.    Hypoglossal Nerve Stimulation:  Hypoglossal nerve stimulation has recently received approval from the [place] Food and Drug Administration for the treatment of obstructive sleep apnea.  This is based on research showing that the system was safe and effective in treating sleep apnea6.  Results showed that the median AHI score decreased 68%, from 29.3 to 9.0. This therapy uses an implant system that senses breathing patterns and delivers mild stimulation to airway muscles, which keeps the airway open during sleep.  The system consists of three fully implanted components: a small generator (similar in size to a pacemaker), a breathing sensor, and a stimulation lead.  Using a small handheld remote, a patient turns the therapy on before bed and off upon awakening.    Candidates for this device must be greater than 22 years of age, have moderate to severe EDELMIRA (AHI between 20-65), BMI less than 32, have tried CPAP/oral appliance without success, and have appropriate upper airway anatomy (determined by a sleep endoscopy performed by Dr. Zhang).    Hypoglossal Nerve Stimulation Pathway:    The sleep surgeon s office will work with the patient through the insurance prior-authorization process (including communications and appeals).    Nasal Procedures:  Nasal obstruction can interfere with nasal breathing during the day and night.  Studies have shown  that relief of nasal obstruction can improve the ability of some patients to tolerate positive airway pressure therapy for obstructive sleep apnea1.  Treatment options include medications such as nasal saline, topical corticosteroid and antihistamine sprays, and oral medications such as antihistamines or decongestants. Non-surgical treatments can include external nasal dilators for selected patients. If these are not successful by themselves, surgery can improve the nasal airway either alone or in combination with these other options.      Combination Procedures:  Combination of surgical procedures and other treatments may be recommended, particularly if patients have more than one area of narrowing or persistent positional disease.  The success rate of combination surgery ranges from 66-80%2,3.      1. Mikaela FARMER. The Role of the Nose in Snoring and Obstructive Sleep Apnoea: An Update.  Eur Arch Otorhinolaryngol. 2011; 268: 1365-73.  2.  Maile SM; Alejandra JA; Radha JR; Pallanch JF; Janna MB; Linda SG; Lu FALCON. Surgical modifications of the upper airway for obstructive sleep apnea in adults: a systematic review and meta-analysis. SLEEP 2010;33(10):0586-4933. Greg HEIN. Hypopharyngeal surgery in obstructive sleep apnea: an evidence-based medicine review.  Arch Otolaryngol Head Neck Surg. 2006 Feb;132(2):206-13.  3. Wale YH1, You Y, Tylor SHIMON. The efficacy of anatomically based multilevel surgery for obstructive sleep apnea. Otolaryngol Head Neck Surg. 2003 Oct;129(4):327-35.  4. Greg HEIN, Goldberg A. Hypopharyngeal Surgery in Obstructive Sleep Apnea: An Evidence-Based Medicine Review. Arch Otolaryngol Head Neck Surg. 2006 Feb;132(2):206-13.  5. Pramod PJ et al. Upper-Airway Stimulation for Obstructive Sleep Apnea.  N Engl J Med. 2014 Jan 9;370(2):139-49.  6. Jer Y et al. Increased Incidence of Cardiovascular Disease in Middle-aged Men with Obstructive Sleep Apnea. Am J Respir Crit Care Med; 2002 166:  159-165  7. Darlyn OLVERA et al. Studying Life Effects and Effectiveness of Palatopharyngoplasty (SLEEP) study: Subjective Outcomes of Isolated Uvulopalatopharyngoplasty. Otolaryngol Head Neck Surg. 2011; 144: 623-631.    Your blood pressure was checked while you were in clinic today.  Please read the guidelines below about what these numbers mean and what you should do about them.  Your systolic blood pressure is the top number.  This is the pressure when the heart is pumping.  Your diastolic blood pressure is the bottom number.  This is the pressure in between beats.  If your systolic blood pressure is less than 120 and your diastolic blood pressure is less than 80, then your blood pressure is normal. There is nothing more that you need to do about it  If your systolic blood pressure is 120-139 or your diastolic blood pressure is 80-89, your blood pressure may be higher than it should be.  You should have your blood pressure re-checked within a year by a primary care provider.  If your systolic blood pressure is 140 or greater or your diastolic blood pressure is 90 or greater, you may have high blood pressure.  High blood pressure is treatable, but if left untreated over time it can put you at risk for heart attack, stroke, or kidney failure.  You should have your blood pressure re-checked by a primary care provider within the next four weeks.    Good Sleep hygiene tips (American Academy of Sleep Medicine):  Maintain a regular sleep-wake routine    Go to bed at the same time. Wake up at the same time. Ideally, your schedule will remain the same (+/- 20 minutes) every night of the week.  Avoid naps if possible    Naps decrease the  Sleep Debt  that is so necessary for easy sleep onset.    Each of us needs a certain amount of sleep per 24-hour period. We need that amount, and we don t need more than that.    When we take naps, it decreases the amount of sleep that we need the next night - which may cause sleep  fragmentation and difficulty initiating sleep, and may lead to insomnia.  Don t stay in bed awake for more than 15-20 minutes. Stimulus control    If you find your mind racing, or worrying about not being able to sleep during the middle of the night, get out of bed, and sit in a chair in the dark. Do your mind racing in the chair until you are sleepy, then return to bed. No TV or internet or eat during these periods! That will just stimulate you more than desired.    If this happens several times during the night, that is OK. Just maintain your regular wake time, and try to avoid naps.  Don t watch TV or read in bed or eat in bed.    When you watch TV or read in bed or eat in bed, you associate the bed with wakefulness.    The bed is reserved for two things - sleep and hanky panky.  Drink caffeinated drinks with caution    The effects of caffeine may last for several hours after ingestion. Caffeine can fragment sleep, and cause difficulty initiating sleep. If you drink caffeine, use it only before noon.    Remember that soda and tea contain caffeine as well.  Avoid inappropriate substances that interfere with sleep    Cigarettes, alcohol, and over-the-counter medications may cause fragmented sleep.  Exercise regularly    Exercise before 2 pm every day. Exercise promotes continuous sleep.    Avoid rigorous exercise before bedtime. Rigorous exercise circulates endorphins into the body which may cause difficulty initiating sleep.   Have a quiet, comfortable bedroom    Set your bedroom thermostat at a comfortable temperature. Generally, a little cooler is better than a little warmer.    Turn off the TV and other extraneous noise that may disrupt sleep. Background  white noise  like a fan is OK.    If your pets awaken you, keep them outside the bedroom.    Your bedroom should be dark. Turn off bright lights.    Have a comfortable mattress.  If you are a  clock watcher  at night, hide the clock.      Have a comfortable  pre-bedtime routine    A warm bath, shower    Meditation, or quiet time    Wind-down 20 minutes prior of bedtime.

## 2022-10-15 ENCOUNTER — HEALTH MAINTENANCE LETTER (OUTPATIENT)
Age: 57
End: 2022-10-15

## 2022-11-22 ASSESSMENT — ENCOUNTER SYMPTOMS
NAUSEA: 0
ARTHRALGIAS: 1
WEAKNESS: 0
EYE PAIN: 0
DIARRHEA: 0
PARESTHESIAS: 0
HEADACHES: 1
MYALGIAS: 0
JOINT SWELLING: 1
SORE THROAT: 0
NERVOUS/ANXIOUS: 1
HEMATURIA: 0
HEARTBURN: 0
FEVER: 0
COUGH: 0
HEMATOCHEZIA: 0
CHILLS: 0
PALPITATIONS: 0
BREAST MASS: 0
ABDOMINAL PAIN: 0
DIZZINESS: 0
FREQUENCY: 0
CONSTIPATION: 0
SHORTNESS OF BREATH: 0
DYSURIA: 0

## 2022-11-23 ENCOUNTER — OFFICE VISIT (OUTPATIENT)
Dept: FAMILY MEDICINE | Facility: CLINIC | Age: 57
End: 2022-11-23
Payer: COMMERCIAL

## 2022-11-23 VITALS
RESPIRATION RATE: 16 BRPM | HEIGHT: 67 IN | HEART RATE: 76 BPM | SYSTOLIC BLOOD PRESSURE: 130 MMHG | BODY MASS INDEX: 39.98 KG/M2 | TEMPERATURE: 98.1 F | OXYGEN SATURATION: 96 % | WEIGHT: 254.7 LBS | DIASTOLIC BLOOD PRESSURE: 80 MMHG

## 2022-11-23 DIAGNOSIS — Z11.4 SCREENING FOR HIV (HUMAN IMMUNODEFICIENCY VIRUS): ICD-10-CM

## 2022-11-23 DIAGNOSIS — Z11.4 ENCOUNTER FOR SCREENING FOR HUMAN IMMUNODEFICIENCY VIRUS (HIV): ICD-10-CM

## 2022-11-23 DIAGNOSIS — Z80.0 FAMILY HISTORY OF COLON CANCER: ICD-10-CM

## 2022-11-23 DIAGNOSIS — Z80.8: ICD-10-CM

## 2022-11-23 DIAGNOSIS — Z00.00 ROUTINE GENERAL MEDICAL EXAMINATION AT A HEALTH CARE FACILITY: Primary | ICD-10-CM

## 2022-11-23 DIAGNOSIS — Z11.59 ENCOUNTER FOR HEPATITIS C SCREENING TEST FOR LOW RISK PATIENT: ICD-10-CM

## 2022-11-23 DIAGNOSIS — Z13.6 CARDIOVASCULAR SCREENING; LDL GOAL LESS THAN 130: ICD-10-CM

## 2022-11-23 LAB
ALBUMIN SERPL BCG-MCNC: 4.8 G/DL (ref 3.5–5.2)
ALP SERPL-CCNC: 91 U/L (ref 35–104)
ALT SERPL W P-5'-P-CCNC: 47 U/L (ref 10–35)
ANION GAP SERPL CALCULATED.3IONS-SCNC: 13 MMOL/L (ref 7–15)
AST SERPL W P-5'-P-CCNC: 30 U/L (ref 10–35)
BILIRUB SERPL-MCNC: 0.5 MG/DL
BUN SERPL-MCNC: 23 MG/DL (ref 6–20)
CALCIUM SERPL-MCNC: 9.4 MG/DL (ref 8.6–10)
CHLORIDE SERPL-SCNC: 100 MMOL/L (ref 98–107)
CHOLEST SERPL-MCNC: 211 MG/DL
CREAT SERPL-MCNC: 0.83 MG/DL (ref 0.51–0.95)
DEPRECATED HCO3 PLAS-SCNC: 25 MMOL/L (ref 22–29)
GFR SERPL CREATININE-BSD FRML MDRD: 81 ML/MIN/1.73M2
GLUCOSE SERPL-MCNC: 94 MG/DL (ref 70–99)
HDLC SERPL-MCNC: 74 MG/DL
LDLC SERPL CALC-MCNC: 117 MG/DL
NONHDLC SERPL-MCNC: 137 MG/DL
POTASSIUM SERPL-SCNC: 4.3 MMOL/L (ref 3.4–5.3)
PROT SERPL-MCNC: 7.7 G/DL (ref 6.4–8.3)
SODIUM SERPL-SCNC: 138 MMOL/L (ref 136–145)
TRIGL SERPL-MCNC: 98 MG/DL
TSH SERPL DL<=0.005 MIU/L-ACNC: 1.96 UIU/ML (ref 0.3–4.2)

## 2022-11-23 PROCEDURE — 80061 LIPID PANEL: CPT | Performed by: INTERNAL MEDICINE

## 2022-11-23 PROCEDURE — 80053 COMPREHEN METABOLIC PANEL: CPT | Performed by: INTERNAL MEDICINE

## 2022-11-23 PROCEDURE — 36415 COLL VENOUS BLD VENIPUNCTURE: CPT | Performed by: INTERNAL MEDICINE

## 2022-11-23 PROCEDURE — 87389 HIV-1 AG W/HIV-1&-2 AB AG IA: CPT | Performed by: INTERNAL MEDICINE

## 2022-11-23 PROCEDURE — 84443 ASSAY THYROID STIM HORMONE: CPT | Performed by: INTERNAL MEDICINE

## 2022-11-23 PROCEDURE — 99396 PREV VISIT EST AGE 40-64: CPT | Performed by: INTERNAL MEDICINE

## 2022-11-23 PROCEDURE — 86803 HEPATITIS C AB TEST: CPT | Performed by: INTERNAL MEDICINE

## 2022-11-23 ASSESSMENT — ENCOUNTER SYMPTOMS
PARESTHESIAS: 0
DIARRHEA: 0
MYALGIAS: 0
CONSTIPATION: 0
HEADACHES: 1
WEAKNESS: 0
ABDOMINAL PAIN: 0
SHORTNESS OF BREATH: 0
FREQUENCY: 0
DYSURIA: 0
ARTHRALGIAS: 1
HEMATOCHEZIA: 0
BREAST MASS: 0
FEVER: 0
EYE PAIN: 0
JOINT SWELLING: 1
NERVOUS/ANXIOUS: 1
COUGH: 0
SORE THROAT: 0
PALPITATIONS: 0
HEMATURIA: 0
CHILLS: 0
HEARTBURN: 0
NAUSEA: 0
DIZZINESS: 0

## 2022-11-23 ASSESSMENT — PAIN SCALES - GENERAL: PAINLEVEL: NO PAIN (0)

## 2022-11-23 NOTE — PROGRESS NOTES
Chief Complaint   Patient presents with     Physical        SUBJECTIVE:   CC: Ne is an 57 year old who presents for preventive health visit.         Patient has been advised of split billing requirements and indicates understanding: Yes       Healthy Habits:     Getting at least 3 servings of Calcium per day:  Yes    Bi-annual eye exam:  Yes    Dental care twice a year:  Yes    Sleep apnea or symptoms of sleep apnea:  Sleep apnea    Diet:  Regular (no restrictions)    Frequency of exercise:  None    Taking medications regularly:  Not Applicable    Medication side effects:  Not applicable    PHQ-2 Total Score: 1    Additional concerns today:  No            Today's PHQ-2 Score:   PHQ-2 ( 1999 [employer]) 11/22/2022   Q1: Little interest or pleasure in doing things 1   Q2: Feeling down, depressed or hopeless 0   PHQ-2 Score 1   PHQ-2 Total Score (12-17 Years)- Positive if 3 or more points; Administer PHQ-A if positive -   Q1: Little interest or pleasure in doing things Several days   Q2: Feeling down, depressed or hopeless Not at all   PHQ-2 Score 1           Social History     Tobacco Use     Smoking status: Never     Passive exposure: Never     Smokeless tobacco: Never   Substance Use Topics     Alcohol use: Yes     Comment: very little         Alcohol Use 11/22/2022   Prescreen: >3 drinks/day or >7 drinks/week? Not Applicable   Prescreen: >3 drinks/day or >7 drinks/week? -       Reviewed orders with patient.  Reviewed health maintenance and updated orders accordingly - Yes  Labs reviewed in EPIC  BP Readings from Last 3 Encounters:   11/23/22 130/80   11/01/21 (!) 142/84   08/31/21 132/80    Wt Readings from Last 3 Encounters:   11/23/22 115.5 kg (254 lb 11.2 oz)   11/01/21 107.5 kg (236 lb 14.4 oz)   08/31/21 108.6 kg (239 lb 8 oz)                  Patient Active Problem List   Diagnosis     Other and unspecified anterior pituitary hyperfunction     Sarcoidosis     Menorrhagia     Family history of colon cancer      HYPERLIPIDEMIA LDL GOAL <160     Benign neoplasm of colon     Sleep concern     Family history of malignant neoplasm of thyroid gland     Morbid obesity, unspecified obesity type (H)     Benign essential hypertension     Urinary, incontinence, stress female     Pelvic floor dysfunction     Lesion of bladder     Past Surgical History:   Procedure Laterality Date     ARTHROSCOPY KNEE RT/LT  2009    Dr. Hodges, Right     COLONOSCOPY  2019     CYSTOSCOPY       LAPAROSCOPIC CHOLECYSTECTOMY  9/10/2008    Dr. Aguayo     THORACOSCOPIC BIOPSY RIB  2004    Dr. Alex CASTELLANOS RAD RESEC TONSIL/PILLARS  ~       Social History     Tobacco Use     Smoking status: Never     Passive exposure: Never     Smokeless tobacco: Never   Substance Use Topics     Alcohol use: Yes     Comment: very little     Family History   Problem Relation Age of Onset     Hypertension Mother      Arthritis Mother         osteoarthritis     Cerebrovascular Disease Mother      Osteoporosis Mother      Cancer - colorectal Father          at age 64; Dx @61     Colon Cancer Father      Diabetes Maternal Grandmother      Thyroid Disease Sister      Hypertension Sister      Hypertension Sister      Hypertension Sister      Hypertension Sister      Hypertension Sister      Diabetes Sister      Obesity Sister      Hypertension Sister          Current Outpatient Medications   Medication Sig Dispense Refill     acetaminophen (TYLENOL) 500 MG tablet Take 500-1,000 mg by mouth every 6 hours as needed for mild pain       calcium carbonate-vitamin D (OS-KIZZY WITH D) 500-200 MG-UNIT tablet TAKE 1 TABLET BY MOUTH 2 TIMES DAILY 60 tablet 11     cetirizine (ZYRTEC) 10 MG tablet Take 10 mg by mouth daily as needed for allergies       magnesium 100 MG CAPS Take 400 mg by mouth daily        Melatonin 10 MG TABS tablet Take 10 mg by mouth nightly as needed for sleep       Misc Natural Products (GLUCOSAMINE CHONDROITIN ADV PO) Take 4 capsules by mouth daily        MULTI-DAY VITAMINS OR None Entered       OMEGA 3 1000 MG OR CAPS daily       tretinoin (RETIN-A) 0.025 % external cream Apply topically daily as needed       calcium carbonate-vitamin D (OSCAL W/D) 500-200 MG-UNIT tablet Take 1 tablet by mouth 2 times daily (Patient not taking: Reported on 11/23/2022) 180 tablet 3     Allergies   Allergen Reactions     No Known Drug Allergies      Recent Labs   Lab Test 01/05/21  0743 12/31/19  1138 06/27/18  0948 11/20/17  1456 08/31/16  0914   * 126*  --  129* 134*   HDL 76 81  --  64 50   TRIG 70 75  --  146 152*   ALT 32  --  42 85* 58*   CR 0.87  --   --  0.94 0.88   GFRESTIMATED 74  --   --  62 67   GFRESTBLACK 85  --   --  75 81   POTASSIUM 4.2  --   --  4.2 4.3   TSH  --   --   --  2.10 1.40        Breast Cancer Screening:    FHS-7:   Breast CA Risk Assessment (FHS-7) 7/13/2021 8/15/2022 11/22/2022   Did any of your first-degree relatives have breast or ovarian cancer? No No No   Did any of your relatives have bilateral breast cancer? - No No   Did any man in your family have breast cancer? - No No   Did any woman in your family have breast and ovarian cancer? - No No   Did any woman in your family have breast cancer before age 50 y? - No No   Do you have 2 or more relatives with breast and/or ovarian cancer? - No No   Do you have 2 or more relatives with breast and/or bowel cancer? - No Yes       Mammogram Screening: Recommended mammography every 1-2 years with patient discussion and risk factor consideration  Pertinent mammograms are reviewed under the imaging tab.    History of abnormal Pap smear: NO - age 30-65 PAP every 5 years with negative HPV co-testing recommended  PAP / HPV Latest Ref Rng & Units 8/16/2021 6/27/2018 5/26/2015   PAP   Other-Negative for Intraepithelial Lesion or Malignancy (NILM) - -   PAP (Historical) - - NIL NIL   HPV16 Negative Negative Negative -   HPV18 Negative Negative Negative -   HRHPV Negative Negative Negative -     Reviewed and  updated as needed this visit by clinical staff   Tobacco  Allergies  Meds  Problems  Med Hx  Surg Hx  Fam Hx          Reviewed and updated as needed this visit by Provider   Tobacco  Allergies  Meds  Problems  Med Hx  Surg Hx  Fam Hx         Past Medical History:   Diagnosis Date     Arthritis knees     Herpes zoster without mention of complication      Hypertension previously     Other and unspecified anterior pituitary hyperfunction 1990    saw endo; MRI; Elevated Prolactin level     Other and unspecified hyperlipidemia      Sarcoidosis 2004    BX per Pulmonary; Prednisone for a bit     Uncomplicated asthma younger     Unspecified hemorrhoids without mention of complication       Past Surgical History:   Procedure Laterality Date     ARTHROSCOPY KNEE RT/LT  4/30/2009    Dr. Hodges, Right     COLONOSCOPY  2019     CYSTOSCOPY       LAPAROSCOPIC CHOLECYSTECTOMY  9/10/2008    Dr. Aguayo     THORACOSCOPIC BIOPSY RIB  2004    Dr. Alex DENNIS RAD RESEC TONSIL/PILLARS  ~2005       Review of Systems   Constitutional: Negative for chills and fever.   HENT: Negative for congestion, ear pain, hearing loss and sore throat.    Eyes: Negative for pain and visual disturbance.   Respiratory: Negative for cough and shortness of breath.    Cardiovascular: Negative for chest pain, palpitations and peripheral edema.   Gastrointestinal: Negative for abdominal pain, constipation, diarrhea, heartburn, hematochezia and nausea.   Breasts:  Negative for tenderness, breast mass and discharge.   Genitourinary: Negative for dysuria, frequency, genital sores, hematuria, pelvic pain, urgency, vaginal bleeding and vaginal discharge.   Musculoskeletal: Positive for arthralgias and joint swelling. Negative for myalgias.   Skin: Negative for rash.   Neurological: Positive for headaches. Negative for dizziness, weakness and paresthesias.   Psychiatric/Behavioral: Positive for mood changes. The patient is nervous/anxious.   "  chiropractic adjustments every 3 weeks PT with Craniofacial therapy    OBJECTIVE:   /80 (BP Location: Right arm, Patient Position: Sitting, Cuff Size: Adult Large)   Pulse 76   Temp 98.1  F (36.7  C) (Oral)   Resp 16   Ht 1.702 m (5' 7\")   Wt 115.5 kg (254 lb 11.2 oz)   SpO2 96%   BMI 39.89 kg/m    Physical Exam  GENERAL: healthy, alert and no distress  EYES: Eyes grossly normal to inspection and wearing glasses  NECK: no adenopathy, no asymmetry, masses, or scars and thyroid normal to palpation  RESP: lungs clear to auscultation - no rales, rhonchi or wheezes  CV: regular rate and rhythm, normal S1 S2, no S3 or S4, no murmur, click or rub, no peripheral edema and peripheral pulses strong  ABDOMEN: soft, nontender, no hepatosplenomegaly, no masses and bowel sounds normal  MS: no gross musculoskeletal defects noted, no edema  NEURO: Normal strength and tone, sensory exam grossly normal, mentation intact, gait normal including heel/toe/tandem walking and Romberg normal  PSYCH: mentation appears normal, affect normal/bright    Diagnostic Test Results:  Labs reviewed in Epic              ASSESSMENT/PLAN:   (Z00.00) Routine general medical examination at a health care facility  (primary encounter diagnosis)  Comment: HEALTH CARE MAINTENANCE reviewed  Plan:     (Z80.0) Family history of colon cancer  Comment: colon screening reviewed  Plan:     (Z80.8) Family history of malignant neoplasm of thyroid gland  Comment: several sisters with thryoid disease; one with thyroid cancer ( surgery, radiation); she is interested in thyroid labs  Plan: TSH with free T4 reflex          (Z13.6) CARDIOVASCULAR SCREENING; LDL GOAL LESS THAN 130  Comment: screening labs reviewed and orders placed  Plan: Comprehensive metabolic panel, Lipid panel         reflex to direct LDL Fasting          (Z11.4) Encounter for screening for human immunodeficiency virus (HIV)  Comment: screening labs  Plan: HIV Antigen Antibody Combo        "   (Z11.59) Encounter for hepatitis C screening test for low risk patient  Comment: screening labs  Plan: Hepatitis C Screen Reflex to HCV RNA Quant and Genotype            Patient has been advised of split billing requirements and indicates understanding: Yes      COUNSELING:  Reviewed preventive health counseling, as reflected in patient instructions       Regular exercise       Healthy diet/nutrition       Consider Hep C screening for all patients one time for ages 18-79 years       HIV screeninx in teen years, 1x in adult years, and at intervals if high risk        She reports that she has never smoked. She has never been exposed to tobacco smoke. She has never used smokeless tobacco.      Dorene Waldron MD  Internal Medicine   [facility]

## 2022-11-25 LAB
HCV AB SERPL QL IA: NONREACTIVE
HIV 1+2 AB+HIV1 P24 AG SERPL QL IA: NONREACTIVE

## 2022-12-04 ENCOUNTER — MYC MEDICAL ADVICE (OUTPATIENT)
Dept: FAMILY MEDICINE | Facility: CLINIC | Age: 57
End: 2022-12-04

## 2022-12-05 NOTE — TELEPHONE ENCOUNTER
Please see [facility] message in reference to recent labs. Routed to PCP, Please review and advise.     Thank you,    Eldon Marie RN

## 2022-12-28 NOTE — TELEPHONE ENCOUNTER
Last Comprehensive Metabolic Panel:  Sodium   Date Value Ref Range Status   11/23/2022 138 136 - 145 mmol/L Final   01/05/2021 139 133 - 144 mmol/L Final     Potassium   Date Value Ref Range Status   11/23/2022 4.3 3.4 - 5.3 mmol/L Final   01/05/2021 4.2 3.4 - 5.3 mmol/L Final     Chloride   Date Value Ref Range Status   11/23/2022 100 98 - 107 mmol/L Final   01/05/2021 105 94 - 109 mmol/L Final     Carbon Dioxide   Date Value Ref Range Status   01/05/2021 31 20 - 32 mmol/L Final     Carbon Dioxide (CO2)   Date Value Ref Range Status   11/23/2022 25 22 - 29 mmol/L Final     Anion Gap   Date Value Ref Range Status   11/23/2022 13 7 - 15 mmol/L Final   01/05/2021 3 3 - 14 mmol/L Final     Glucose   Date Value Ref Range Status   11/23/2022 94 70 - 99 mg/dL Final   01/05/2021 88 70 - 99 mg/dL Final     Comment:     Fasting specimen     Urea Nitrogen   Date Value Ref Range Status   11/23/2022 23.0 (H) 6.0 - 20.0 mg/dL Final   01/05/2021 23 7 - 30 mg/dL Final     Creatinine   Date Value Ref Range Status   11/23/2022 0.83 0.51 - 0.95 mg/dL Final   01/05/2021 0.87 0.52 - 1.04 mg/dL Final     GFR Estimate   Date Value Ref Range Status   11/23/2022 81 >60 mL/min/1.73m2 Final     Comment:     Effective December 21, 2021 eGFRcr in adults is calculated using the 2021 CKD-EPI creatinine equation which includes age and gender (Patricia et al., NEJM, DOI: 10.1056/UBXGmf3685291)   01/05/2021 74 >60 mL/min/[1.73_m2] Final     Comment:     Non  GFR Calc  Starting 12/18/2018, serum creatinine based estimated GFR (eGFR) will be   calculated using the Chronic Kidney Disease Epidemiology Collaboration   (CKD-EPI) equation.       Calcium   Date Value Ref Range Status   11/23/2022 9.4 8.6 - 10.0 mg/dL Final   01/05/2021 8.7 8.5 - 10.1 mg/dL Final     Bilirubin Total   Date Value Ref Range Status   11/23/2022 0.5 <=1.2 mg/dL Final   01/05/2021 0.6 0.2 - 1.3 mg/dL Final     Alkaline Phosphatase   Date Value Ref Range Status    11/23/2022 91 35 - 104 U/L Final   01/05/2021 78 40 - 150 U/L Final     ALT   Date Value Ref Range Status   11/23/2022 47 (H) 10 - 35 U/L Final   01/05/2021 32 0 - 50 U/L Final     AST   Date Value Ref Range Status   11/23/2022 30 10 - 35 U/L Final   01/05/2021 20 0 - 45 U/L Final      Labs reviewed. Continue  healthy lifestyle choices; diet and exercise.   Written by Dorene Waldron MD on 12/26/2022  7:40 PM CST  Seen by patient Ne Chavis on 12/26/2022  8:00 PM

## 2022-12-28 NOTE — TELEPHONE ENCOUNTER
[facility], she has not received response. Please follow up with her asap.    PH: [phone]    Teetee KELLY  [facility]   [facility]

## 2022-12-28 NOTE — TELEPHONE ENCOUNTER
Left detailed message - labs look good- cholesterol better than last year. Total cholesterol and LDLs are lower.  Continue w/ healthy eating and exercise.   ALT is just slightly elevated and AST is normal.     Call w/ questions/concerns.     KATERIN Blankenship w/ Dr. Waldron

## 2023-01-05 ENCOUNTER — OFFICE VISIT (OUTPATIENT)
Dept: FAMILY MEDICINE | Facility: CLINIC | Age: 58
End: 2023-01-05
Payer: COMMERCIAL

## 2023-01-05 VITALS
BODY MASS INDEX: 38.37 KG/M2 | HEART RATE: 90 BPM | OXYGEN SATURATION: 95 % | DIASTOLIC BLOOD PRESSURE: 78 MMHG | RESPIRATION RATE: 16 BRPM | TEMPERATURE: 97.8 F | SYSTOLIC BLOOD PRESSURE: 132 MMHG | WEIGHT: 245 LBS

## 2023-01-05 DIAGNOSIS — J01.00 ACUTE NON-RECURRENT MAXILLARY SINUSITIS: Primary | ICD-10-CM

## 2023-01-05 PROCEDURE — 99213 OFFICE O/P EST LOW 20 MIN: CPT | Performed by: FAMILY MEDICINE

## 2023-01-05 RX ORDER — IPRATROPIUM BROMIDE 42 UG/1
2 SPRAY, METERED NASAL 4 TIMES DAILY
Qty: 15 ML | Refills: 3 | Status: SHIPPED | OUTPATIENT
Start: 2023-01-05 | End: 2023-03-02

## 2023-01-05 RX ORDER — NAPROXEN 500 MG/1
500 TABLET ORAL 2 TIMES DAILY WITH MEALS
Qty: 14 TABLET | Refills: 0 | Status: SHIPPED | OUTPATIENT
Start: 2023-01-05 | End: 2023-10-18

## 2023-01-05 ASSESSMENT — PAIN SCALES - GENERAL: PAINLEVEL: SEVERE PAIN (6)

## 2023-01-05 NOTE — PROGRESS NOTES
"  Assessment & Plan     Acute non-recurrent maxillary sinusitis  Advised about rest, OTC medications for symptoms, drink warm liquids, and may use humidifier at night time to improve the cough.  starton   - ipratropium (ATROVENT) 0.06 % nasal spray; Spray 2 sprays into both nostrils 4 times daily  - naproxen (NAPROSYN) 500 MG tablet; Take 1 tablet (500 mg) by mouth 2 times daily (with meals)             BMI:   Estimated body mass index is 38.37 kg/m  as calculated from the following:    Height as of 11/23/22: 1.702 m (5' 7\").    Weight as of this encounter: 111.1 kg (245 lb).           Follow up in 5 days if symptoms persist, sooner if symptoms worsen or new ones develops, pt may contact us over the phone for any questions or concerns.    Anuja Reed MD  [facility]    Giovani Olivia is a 57 year old, presenting for the following health issues:  Sinus Problem      Sinus Problem     History of Present Illness       Reason for visit:  Dizziness when turning in bed about a week ago that has continued, now nasal stuffiness, cheeks hurt and teeth feel sensitive.    Covid tests times 2 negative.  most recent was yesterday.  Symptom onset:  3-7 days ago  Symptoms include:  Vertigo, sinus congestion and pressure with headache  Symptom intensity:  Moderate  Symptom progression:  Worsening  Had these symptoms before:  No  What makes it worse:  Laying down and turning her head  What makes it better:  Nothing    She eats 4 or more servings of fruits and vegetables daily.She consumes 0 sweetened beverage(s) daily.She exercises with enough effort to increase her heart rate 9 or less minutes per day.  She exercises with enough effort to increase her heart rate 3 or less days per week.   She is taking medications regularly.       Acute Illness  Acute illness concerns:  Possible sinus infection.  Onset/Duration: 10 days ago.  Symptoms:  Fever: No  Chills/Sweats: chills.  Headache (location?): YES  Sinus " Pressure: YES- mainly in the maxillary with teeth pain on the right and left side.  Conjunctivitis:  No  Ear Pain: no  Rhinorrhea: YES  Congestion: YES  Sore Throat: No  Cough: no  Wheeze: No  Decreased Appetite: No  Nausea: No  Vomiting: No  Diarrhea: No  Dysuria/Freq.: No  Dysuria or Hematuria: No  Fatigue/Achiness: YES  Sick/Strep Exposure: No  Therapies tried and outcome: None      Review of Systems         Objective    /78   Pulse 90   Temp 97.8  F (36.6  C) (Oral)   Resp 16   Wt 111.1 kg (245 lb)   LMP 08/07/2021   SpO2 95%   BMI 38.37 kg/m    Body mass index is 38.37 kg/m .  Physical Exam   Head: Normocephalic, atraumatic.  Eyes: Conjunctiva clear, non icteric. PERRLA.  Ears: External ears nl, TM is nl   Nose: Septum midline, nasal mucosa congested. No discharge.  There is mild tenderness over the maxillary sinuses, there is mild tenderness over the frontal sinuses  Mouth / Throat: Normal dentition.  No oral lesions. Pharynx mild erythematous, tonsils no exudate/hypertrophy.  Neck: Supple, no enlarged LN, trachea midline.  LUNGS:  CTA B/L, no wheezing or crackles.  CVS : RRR, no murmur, no rub.

## 2023-03-01 DIAGNOSIS — J01.00 ACUTE NON-RECURRENT MAXILLARY SINUSITIS: ICD-10-CM

## 2023-03-02 RX ORDER — IPRATROPIUM BROMIDE 42 UG/1
2 SPRAY, METERED NASAL 4 TIMES DAILY
Qty: 15 ML | Refills: 3 | Status: SHIPPED | OUTPATIENT
Start: 2023-03-02 | End: 2023-10-18

## 2023-05-29 ENCOUNTER — MYC MEDICAL ADVICE (OUTPATIENT)
Dept: FAMILY MEDICINE | Facility: CLINIC | Age: 58
End: 2023-05-29
Payer: COMMERCIAL

## 2023-05-29 DIAGNOSIS — R51.9 NOCTURNAL HEADACHES: ICD-10-CM

## 2023-05-29 DIAGNOSIS — E22.9 ANTERIOR PITUITARY HYPERFUNCTION (H): Primary | ICD-10-CM

## 2023-05-30 NOTE — TELEPHONE ENCOUNTER
"Routing to Dr. Waldron to advise if patient is needing a repeat MRI of the brain to follow up on the cystic lesion on the right basal ganglia.     MRI 11/26/2017:  \"IMPRESSION:  1. Stable cystic lesion in the right basal ganglia.  2. No evidence for pituitary mass.  3. Exam otherwise normal and unchanged since 4/6/2012.\"    MRI 4/6/2012:  \"IMPRESSION:   1. Stable appearing tiny cystic lesion in the basal ganglia on the   right.   2. Otherwise, normal brain MRI.   3. No evidence for abnormality of the pituitary gland.  \"  "

## 2023-06-05 NOTE — TELEPHONE ENCOUNTER
Ne is calling to check status of MRI request. Please review and let patient know what next steps.     Prabha Dunbar

## 2023-06-12 NOTE — TELEPHONE ENCOUNTER
"Chart reviewed.   Last MRI was done 11/26/2027.  \"presumeably benign given its lack of interval change. \"  \"stable cystic lesion\"  No further scans needed at this time.       No order placed.   It is not clear if you previously followed with neurology.     "

## 2023-06-14 NOTE — TELEPHONE ENCOUNTER
Happy to hear you followed up with neurology. Hopefully neurology will be able to improve headaches.

## 2023-06-20 NOTE — TELEPHONE ENCOUNTER
Agree wit plan as outlined.   Pt must sign LILY to have information sent to PCP. Perhaps a different neurology evaluation?

## 2023-06-26 DIAGNOSIS — N95.1 MENOPAUSAL AND FEMALE CLIMACTERIC STATES: ICD-10-CM

## 2023-09-29 ENCOUNTER — ANCILLARY PROCEDURE (OUTPATIENT)
Dept: MAMMOGRAPHY | Facility: CLINIC | Age: 58
End: 2023-09-29
Attending: INTERNAL MEDICINE
Payer: MEDICAID

## 2023-09-29 DIAGNOSIS — Z12.31 VISIT FOR SCREENING MAMMOGRAM: ICD-10-CM

## 2023-09-29 PROCEDURE — 77067 SCR MAMMO BI INCL CAD: CPT | Mod: TC | Performed by: RADIOLOGY

## 2023-09-29 PROCEDURE — 77063 BREAST TOMOSYNTHESIS BI: CPT | Mod: TC | Performed by: RADIOLOGY

## 2023-10-17 ASSESSMENT — ENCOUNTER SYMPTOMS
COUGH: 0
PALPITATIONS: 0
DIARRHEA: 0
CHILLS: 0
HEMATURIA: 0
WEAKNESS: 0
DYSURIA: 0
BREAST MASS: 0
SHORTNESS OF BREATH: 0
NERVOUS/ANXIOUS: 0
HEADACHES: 1
HEARTBURN: 0
DIZZINESS: 1
JOINT SWELLING: 1
NAUSEA: 0
ARTHRALGIAS: 1
CONSTIPATION: 0
MYALGIAS: 0
PARESTHESIAS: 0
FREQUENCY: 0
ABDOMINAL PAIN: 0
EYE PAIN: 0
SORE THROAT: 0
HEMATOCHEZIA: 0
FEVER: 0

## 2023-10-18 ENCOUNTER — OFFICE VISIT (OUTPATIENT)
Dept: FAMILY MEDICINE | Facility: CLINIC | Age: 58
End: 2023-10-18
Payer: COMMERCIAL

## 2023-10-18 VITALS
WEIGHT: 245.1 LBS | HEART RATE: 80 BPM | OXYGEN SATURATION: 97 % | BODY MASS INDEX: 38.47 KG/M2 | HEIGHT: 67 IN | RESPIRATION RATE: 18 BRPM | SYSTOLIC BLOOD PRESSURE: 124 MMHG | DIASTOLIC BLOOD PRESSURE: 82 MMHG | TEMPERATURE: 98.1 F

## 2023-10-18 DIAGNOSIS — E66.01 MORBID OBESITY, UNSPECIFIED OBESITY TYPE (H): ICD-10-CM

## 2023-10-18 DIAGNOSIS — Z00.00 ROUTINE GENERAL MEDICAL EXAMINATION AT A HEALTH CARE FACILITY: Primary | ICD-10-CM

## 2023-10-18 DIAGNOSIS — G47.09 OTHER INSOMNIA: ICD-10-CM

## 2023-10-18 LAB
ALBUMIN SERPL BCG-MCNC: 4.8 G/DL (ref 3.5–5.2)
ALP SERPL-CCNC: 89 U/L (ref 35–104)
ALT SERPL W P-5'-P-CCNC: 22 U/L (ref 0–50)
ANION GAP SERPL CALCULATED.3IONS-SCNC: 13 MMOL/L (ref 7–15)
AST SERPL W P-5'-P-CCNC: 24 U/L (ref 0–45)
BILIRUB SERPL-MCNC: 0.4 MG/DL
BUN SERPL-MCNC: 22.1 MG/DL (ref 8–23)
CALCIUM SERPL-MCNC: 9.5 MG/DL (ref 8.6–10)
CHLORIDE SERPL-SCNC: 104 MMOL/L (ref 98–107)
CREAT SERPL-MCNC: 0.88 MG/DL (ref 0.51–0.95)
DEPRECATED HCO3 PLAS-SCNC: 24 MMOL/L (ref 22–29)
EGFRCR SERPLBLD CKD-EPI 2021: 75 ML/MIN/1.73M2
GLUCOSE SERPL-MCNC: 103 MG/DL (ref 70–99)
POTASSIUM SERPL-SCNC: 4.7 MMOL/L (ref 3.4–5.3)
PROT SERPL-MCNC: 7.4 G/DL (ref 6.4–8.3)
SODIUM SERPL-SCNC: 141 MMOL/L (ref 135–145)

## 2023-10-18 PROCEDURE — 36415 COLL VENOUS BLD VENIPUNCTURE: CPT | Performed by: NURSE PRACTITIONER

## 2023-10-18 PROCEDURE — 99213 OFFICE O/P EST LOW 20 MIN: CPT | Mod: 25 | Performed by: NURSE PRACTITIONER

## 2023-10-18 PROCEDURE — 90471 IMMUNIZATION ADMIN: CPT | Performed by: NURSE PRACTITIONER

## 2023-10-18 PROCEDURE — 99396 PREV VISIT EST AGE 40-64: CPT | Mod: 25 | Performed by: NURSE PRACTITIONER

## 2023-10-18 PROCEDURE — 90715 TDAP VACCINE 7 YRS/> IM: CPT | Performed by: NURSE PRACTITIONER

## 2023-10-18 PROCEDURE — 80053 COMPREHEN METABOLIC PANEL: CPT | Performed by: NURSE PRACTITIONER

## 2023-10-18 ASSESSMENT — ENCOUNTER SYMPTOMS
HEMATOCHEZIA: 0
HEADACHES: 1
PARESTHESIAS: 0
EYE PAIN: 0
DIARRHEA: 0
PALPITATIONS: 0
DIZZINESS: 1
COUGH: 0
MYALGIAS: 0
HEARTBURN: 0
FEVER: 0
BREAST MASS: 0
CONSTIPATION: 0
NERVOUS/ANXIOUS: 0
ARTHRALGIAS: 1
WEAKNESS: 0
CHILLS: 0
HEMATURIA: 0
SHORTNESS OF BREATH: 0
JOINT SWELLING: 1
ABDOMINAL PAIN: 0
SORE THROAT: 0
FREQUENCY: 0
NAUSEA: 0
DYSURIA: 0

## 2023-10-18 ASSESSMENT — PAIN SCALES - GENERAL: PAINLEVEL: NO PAIN (0)

## 2023-10-18 NOTE — ASSESSMENT & PLAN NOTE
"Sleep issues started in her mid 50's  Uses CPAP-- going well.  Watches TV in bed  To bed:  10-10:30    Up in AM: 7-7:30    Nighttime awakening:  at 1-2 am.  Is awake for one to two hours  Naps: no  Variable sleep wake schedule: no  Caffeine: coffee in am   Alcohol: no  Marijuana no  Anxious thoughts/stress or rumination: \"my mind is always racing\"  Denies symptoms of restless legs, sleepwalking, sleep talking, dream enactment behavior.    Has used tylenol pm, melatonin  "

## 2023-10-18 NOTE — PROGRESS NOTES
"   SUBJECTIVE:   CC: Ne is an 58 year old who presents for preventive health visit.       10/18/2023     8:42 AM   Additional Questions   Roomed by Karolyn WARREN CMA   Accompanied by CANDI         10/18/2023     8:43 AM   Patient Reported Additional Medications   Patient reports taking the following new medications Liquid Collagen, Tumeric         Healthy Habits:     Getting at least 3 servings of Calcium per day:  Yes    Bi-annual eye exam:  Yes    Dental care twice a year:  Yes    Sleep apnea or symptoms of sleep apnea:  Sleep apnea    Diet:  Regular (no restrictions)    Frequency of exercise:  1 day/week    Duration of exercise:  Less than 15 minutes    Taking medications regularly:  Yes    Medication side effects:  Not applicable    Additional concerns today:  Yes    Lmp 8/2021  Other insomnia  Sleep issues started in her mid 50's  Uses CPAP-- going well.  Watches TV in bed  To bed:  10-10:30    Up in AM: 7-7:30    Nighttime awakening:  at 1-2 am.  Is awake for one to two hours  Naps: no  Variable sleep wake schedule: no  Caffeine: coffee in am   Alcohol: no  Marijuana no  Anxious thoughts/stress or rumination: \"my mind is always racing\"  Denies symptoms of restless legs, sleepwalking, sleep talking, dream enactment behavior.    Has used tylenol pm, melatonin    Morbid obesity, unspecified obesity type (H)  Working on intermittent fasting and reports 8 pound weigh loss        Social History     Tobacco Use    Smoking status: Never     Passive exposure: Never    Smokeless tobacco: Never   Substance Use Topics    Alcohol use: Yes     Comment: very little             10/17/2023    11:57 AM   Alcohol Use   Prescreen: >3 drinks/day or >7 drinks/week? No       Breast Cancer Screening:    FHS-7:       7/13/2021     8:23 AM 8/15/2022     9:22 AM 11/22/2022     5:29 PM 9/29/2023    10:59 AM 10/17/2023    11:59 AM   Breast CA Risk Assessment (FHS-7)   Did any of your first-degree relatives have breast or ovarian cancer? No No No " No No   Did any of your relatives have bilateral breast cancer?  No No No No   Did any man in your family have breast cancer?  No No No No   Did any woman in your family have breast and ovarian cancer?  No No No No   Did any woman in your family have breast cancer before age 50 y?  No No No No   Do you have 2 or more relatives with breast and/or ovarian cancer?  No No No No   Do you have 2 or more relatives with breast and/or bowel cancer?  No Yes Yes Yes       Pertinent mammograms are reviewed under the imaging tab.    History of abnormal Pap smear: NO - age 30-65 PAP every 5 years with negative HPV co-testing recommended      Latest Ref Rng & Units 8/16/2021     4:00 PM 6/27/2018     1:05 PM 6/27/2018     9:30 AM   PAP / HPV   PAP  Other-Negative for Intraepithelial Lesion or Malignancy (NILM)      PAP (Historical)   NIL     HPV 16 DNA Negative Negative   Negative    HPV 18 DNA Negative Negative   Negative    Other HR HPV Negative Negative   Negative      Reviewed and updated as needed this visit by clinical staff   Tobacco  Allergies  Meds  Problems  Med Hx  Surg Hx  Fam Hx          Reviewed and updated as needed this visit by Provider   Tobacco  Allergies  Meds  Problems  Med Hx  Surg Hx  Fam Hx             Review of Systems   Constitutional:  Negative for chills and fever.   HENT:  Negative for congestion, ear pain, hearing loss and sore throat.    Eyes:  Negative for pain and visual disturbance.   Respiratory:  Negative for cough and shortness of breath.    Cardiovascular:  Negative for chest pain, palpitations and peripheral edema.   Gastrointestinal:  Negative for abdominal pain, constipation, diarrhea, heartburn, hematochezia and nausea.   Breasts:  Negative for tenderness, breast mass and discharge.   Genitourinary:  Negative for dysuria, frequency, genital sores, hematuria, pelvic pain, urgency, vaginal bleeding and vaginal discharge.   Musculoskeletal:  Positive for arthralgias and joint  "swelling. Negative for myalgias.   Skin:  Negative for rash.   Neurological:  Positive for dizziness and headaches. Negative for weakness and paresthesias.        Episodes of positional vertigo.  Pursuing chiropractor therapy for headache and vertigo   Psychiatric/Behavioral:  Positive for mood changes. The patient is not nervous/anxious.           OBJECTIVE:   /82 (BP Location: Right arm, Patient Position: Sitting, Cuff Size: Adult Large)   Pulse 80   Temp 98.1  F (36.7  C) (Oral)   Resp 18   Ht 1.702 m (5' 7\")   Wt 111.2 kg (245 lb 1.6 oz)   LMP 08/07/2021   SpO2 97%   BMI 38.39 kg/m    Physical Exam  Constitutional:       General: She is not in acute distress.     Appearance: Normal appearance. She is well-developed.   HENT:      Head: Normocephalic.      Right Ear: Tympanic membrane normal.      Left Ear: Tympanic membrane normal.      Mouth/Throat:      Mouth: Mucous membranes are moist.   Eyes:      Conjunctiva/sclera: Conjunctivae normal.   Cardiovascular:      Rate and Rhythm: Normal rate and regular rhythm.      Heart sounds: Normal heart sounds.   Pulmonary:      Effort: Pulmonary effort is normal.      Breath sounds: Normal breath sounds.   Abdominal:      General: Bowel sounds are normal.      Palpations: Abdomen is soft. There is no mass.      Tenderness: There is no abdominal tenderness.   Musculoskeletal:      Cervical back: Neck supple.   Lymphadenopathy:      Cervical: No cervical adenopathy.   Skin:     General: Skin is warm and dry.      Findings: No rash.   Neurological:      General: No focal deficit present.      Mental Status: She is alert and oriented to person, place, and time.   Psychiatric:         Mood and Affect: Mood normal.         Thought Content: Thought content normal.         Judgment: Judgment normal.               ASSESSMENT/PLAN:       ICD-10-CM    1. Routine general medical examination at a health care facility  Z00.00       2. Other insomnia  G47.09       3. " "Morbid obesity, unspecified obesity type (H)  E66.01 Comprehensive metabolic panel (BMP + Alb, Alk Phos, ALT, AST, Total. Bili, TP)     Comprehensive metabolic panel (BMP + Alb, Alk Phos, ALT, AST, Total. Bili, TP)        Wellness exam  Perimenopausal increased insomnia with anxiety component.  Discussed sleep hygiene, self care.  Follow up prn        COUNSELING:  Reviewed preventive health counseling, as reflected in patient instructions      BMI:   Estimated body mass index is 38.39 kg/m  as calculated from the following:    Height as of this encounter: 1.702 m (5' 7\").    Weight as of this encounter: 111.2 kg (245 lb 1.6 oz).   Weight management plan: Discussed healthy diet and exercise guidelines      She reports that she has never smoked. She has never been exposed to tobacco smoke. She has never used smokeless tobacco.          MELANIE Landaverde CNP  [facility]  "

## 2023-10-18 NOTE — PATIENT INSTRUCTIONS
Calm iglesia  [employer]  Audio book [employer]        Preventive Health Recommendations  Female Ages 50 - 64    Yearly exam: See your health care provider every year in order to  Review health changes.   Discuss preventive care.    Review your medicines if your doctor has prescribed any.    Get a Pap test every three years (unless you have an abnormal result and your provider advises testing more often).  If you get Pap tests with HPV test, you only need to test every 5 years, unless you have an abnormal result.   You do not need a Pap test if your uterus was removed (hysterectomy) and you have not had cancer.  You should be tested each year for STDs (sexually transmitted diseases) if you're at risk.   Have a mammogram every 1 to 2 years.  Have a colonoscopy at age 45, or have a yearly FIT test (stool test). These exams screen for colon cancer.    Have a cholesterol test every 5 years, or more often if advised.  Have a diabetes test (fasting glucose) every three years. If you are at risk for diabetes, you should have this test more often.   If you are at risk for osteoporosis (brittle bone disease), think about having a bone density scan (DEXA).    Shots: Get a flu shot each year. Get a tetanus shot every 10 years.    Nutrition:   Eat at least 5 servings of fruits and vegetables each day.  Eat whole-grain bread, whole-wheat pasta and brown rice instead of white grains and rice.  Get adequate Calcium and Vitamin D.     Lifestyle  Exercise at least 150 minutes a week (30 minutes a day, 5 days a week). This will help you control your weight and prevent disease.  Limit alcohol to one drink per day.  No smoking.   Wear sunscreen to prevent skin cancer.   See your dentist every six months for an exam and cleaning.  See your eye doctor every 1 to 2 years.

## 2023-11-02 ENCOUNTER — MYC MEDICAL ADVICE (OUTPATIENT)
Dept: FAMILY MEDICINE | Facility: CLINIC | Age: 58
End: 2023-11-02
Payer: COMMERCIAL

## 2023-11-02 ASSESSMENT — ENCOUNTER SYMPTOMS
DIARRHEA: 1
ABDOMINAL PAIN: 1
VOMITING: 0
FREQUENCY: 0
BELCHING: 0
FEVER: 0
HEMATURIA: 0
NAUSEA: 0
MYALGIAS: 0
CONSTIPATION: 0
HEMATOCHEZIA: 0
FLATUS: 1
ARTHRALGIAS: 0
HEADACHES: 0
DYSURIA: 0
WEIGHT LOSS: 1
ANOREXIA: 1

## 2023-11-03 ENCOUNTER — E-VISIT (OUTPATIENT)
Dept: URGENT CARE | Facility: CLINIC | Age: 58
End: 2023-11-03
Payer: COMMERCIAL

## 2023-11-03 DIAGNOSIS — R19.7 DIARRHEA OF PRESUMED INFECTIOUS ORIGIN: Primary | ICD-10-CM

## 2023-11-03 PROCEDURE — 99207 PR NON-BILLABLE SERV PER CHARTING: CPT | Performed by: PHYSICIAN ASSISTANT

## 2023-11-03 RX ORDER — AZITHROMYCIN 500 MG/1
500 TABLET, FILM COATED ORAL DAILY
Qty: 3 TABLET | Refills: 0 | Status: SHIPPED | OUTPATIENT
Start: 2023-11-03 | End: 2023-11-06

## 2023-11-03 NOTE — PATIENT INSTRUCTIONS
Ne Chavis,    Your symptoms are consistent with diarrhea caused by a bacterial infection. I sent in a prescription for azithromycin to your pharmacy. It is most important that you maintain hydration- water, sports drinks, diluted fruit juice and broths are all good options. Avoid foods with high sugar content since they may make symptoms worse.    Be seen in person at urgent care or by your primary care provider if symptoms worsen, you develop a high fever, severe weakness or fainting, increased abdominal pain, blood in stool or vomit or failure to improve after 10 days of symptoms.    Thanks for choosing us as a partner in your care,    RADHA Leon [facility]

## 2023-11-25 DIAGNOSIS — N95.1 MENOPAUSAL AND FEMALE CLIMACTERIC STATES: ICD-10-CM

## 2024-01-30 ENCOUNTER — PATIENT OUTREACH (OUTPATIENT)
Dept: GASTROENTEROLOGY | Facility: CLINIC | Age: 59
End: 2024-01-30
Payer: COMMERCIAL

## 2024-03-10 DIAGNOSIS — N95.1 MENOPAUSAL AND FEMALE CLIMACTERIC STATES: ICD-10-CM

## 2024-03-11 NOTE — TELEPHONE ENCOUNTER
Last routine OV was with Mercedes Irvin on 10/18/2023.  Routed refill request to Mercedes Irvin, please advise.    Shalonda Hernández RN, BSN  [facility]

## 2024-03-13 NOTE — TELEPHONE ENCOUNTER
Attempted to call pt to let her know her prescription of Oscal was sent in.  [facility]    Shalonda Hernández RN, BSN  [facility]

## 2024-05-20 ENCOUNTER — PATIENT OUTREACH (OUTPATIENT)
Dept: GASTROENTEROLOGY | Facility: CLINIC | Age: 59
End: 2024-05-20
Payer: COMMERCIAL

## 2024-05-20 DIAGNOSIS — Z12.11 SPECIAL SCREENING FOR MALIGNANT NEOPLASMS, COLON: Primary | ICD-10-CM

## 2024-05-20 NOTE — TELEPHONE ENCOUNTER
"CRC Screening Colonoscopy Referral Review    Patient meets the inclusion criteria for screening colonoscopy standing order.    Ordering/Referring Provider:  MELANIE Landaverde CNP     BMI: Estimated body mass index is 38.39 kg/m  as calculated from the following:    Height as of 10/18/23: 1.702 m (5' 7\").    Weight as of 10/18/23: 111.2 kg (245 lb 1.6 oz).     Sedation:  Does patient have any of the following conditions affecting sedation?  No medical conditions affecting sedation.    Previous Scopes:  Any previous recommendations or follow up needs based on previous scope?  na / No recommendations.    Medical Concerns to Postpone Order:  Does patient have any of the following medical concerns that should postpone/delay colonoscopy referral?  No medical conditions affecting colonoscopy referral.    Final Referral Details:  Based on patient's medical history patient is appropriate for referral order with moderate sedation. If patient's BMI > 50 do not schedule in ASC.    Brooklynn Em RN on 5/20/2024 at 9:40 AM    "

## 2024-07-30 DIAGNOSIS — N95.1 MENOPAUSAL AND FEMALE CLIMACTERIC STATES: ICD-10-CM

## 2024-08-02 RX ORDER — CALCIUM CARBONATE/VITAMIN D3 500MG-5MCG
1 TABLET ORAL 2 TIMES DAILY
Qty: 180 TABLET | Refills: 0 | Status: SHIPPED | OUTPATIENT
Start: 2024-08-02

## 2024-12-21 ENCOUNTER — HEALTH MAINTENANCE LETTER (OUTPATIENT)
Age: 59
End: 2024-12-21

## 2025-03-14 NOTE — TELEPHONE ENCOUNTER
Patient is reporting diarrhea since 10/24, after her trip to [place].   Tried Imodium yesterday and it helped yesterday, back today.  No abdominal pain.    Advised evisit with provider to order stool testing if needed.    Berenice Gustafson RN    
See mychart and advise.  Mary Alice Gates RN    
no

## 2025-05-19 ENCOUNTER — PATIENT OUTREACH (OUTPATIENT)
Dept: GASTROENTEROLOGY | Facility: CLINIC | Age: 60
End: 2025-05-19
Payer: COMMERCIAL

## 2025-05-19 DIAGNOSIS — Z12.11 SPECIAL SCREENING FOR MALIGNANT NEOPLASM OF COLON: Primary | ICD-10-CM

## 2025-05-19 NOTE — PROGRESS NOTES
"CRC Screening Colonoscopy Referral Review    Patient meets the inclusion criteria for screening colonoscopy standing order.    Ordering/Referring Provider:  [place]      BMI: Estimated body mass index is 38.39 kg/m  as calculated from the following:    Height as of 10/18/23: 1.702 m (5' 7\").    Weight as of 10/18/23: 111.2 kg (245 lb 1.6 oz).     Sedation:  Does patient have any of the following conditions affecting sedation?  No medical conditions affecting sedation.    Previous Scopes:  Any previous recommendations or follow up needs based on previous scope?  na / No recommendations.    Medical Concerns to Postpone Order:  Does patient have any of the following medical concerns that should postpone/delay colonoscopy referral?  No medical conditions affecting colonoscopy referral.    Final Referral Details:  Based on patient's medical history patient is appropriate for referral order with moderate sedation. If patient's BMI > 50 do not schedule in ASC.  "